# Patient Record
Sex: FEMALE | Race: BLACK OR AFRICAN AMERICAN | NOT HISPANIC OR LATINO | Employment: FULL TIME | URBAN - METROPOLITAN AREA
[De-identification: names, ages, dates, MRNs, and addresses within clinical notes are randomized per-mention and may not be internally consistent; named-entity substitution may affect disease eponyms.]

---

## 2024-07-31 ENCOUNTER — OFFICE VISIT (OUTPATIENT)
Dept: SPORTS MEDICINE | Facility: CLINIC | Age: 59
End: 2024-07-31
Payer: COMMERCIAL

## 2024-07-31 VITALS — HEIGHT: 64 IN | BODY MASS INDEX: 34.33 KG/M2 | WEIGHT: 201.06 LBS

## 2024-07-31 DIAGNOSIS — M25.562 PAIN IN BOTH KNEES, UNSPECIFIED CHRONICITY: Primary | ICD-10-CM

## 2024-07-31 DIAGNOSIS — M25.561 PAIN IN BOTH KNEES, UNSPECIFIED CHRONICITY: Primary | ICD-10-CM

## 2024-07-31 DIAGNOSIS — M17.0 BILATERAL PRIMARY OSTEOARTHRITIS OF KNEE: ICD-10-CM

## 2024-07-31 PROCEDURE — 20611 DRAIN/INJ JOINT/BURSA W/US: CPT | Mod: 50,S$GLB,, | Performed by: STUDENT IN AN ORGANIZED HEALTH CARE EDUCATION/TRAINING PROGRAM

## 2024-07-31 PROCEDURE — 99999 PR PBB SHADOW E&M-EST. PATIENT-LVL III: CPT | Mod: PBBFAC,,, | Performed by: STUDENT IN AN ORGANIZED HEALTH CARE EDUCATION/TRAINING PROGRAM

## 2024-07-31 PROCEDURE — 99499 UNLISTED E&M SERVICE: CPT | Mod: S$GLB,,, | Performed by: STUDENT IN AN ORGANIZED HEALTH CARE EDUCATION/TRAINING PROGRAM

## 2024-07-31 NOTE — PATIENT INSTRUCTIONS
Assessment:  Mackenzie Levi is a 58 y.o. female No chief complaint on file.      No diagnosis found.     Plan:  Provided viscosupplementation injection today. We discussed the proper protocols after the injection such as no submerging pools, baths tubs, or hot tubs for 24 hr.  Showering is okay today.   We discussed red flags such as fevers, chills, red, warm, tender joint at the area of injection to please seek medical care immediately.      This is a series of  three injections over three weeks. In this procedure, a gel-like fluid called hyaluronic acid is injected into the knee joint. Hyaluronic acid is a naturally occurring substance found in the synovial fluid surrounding joints. People with osteoarthritis have a lower-than-normal concentration of hyaluronic acid in their joints The theory is that adding hyaluronic acid to the arthritic joint will facilitate movement and reduce pain through a cellular level of improvement. The receptors within the knee are then down regulated to reduce the sensitivity and there is a greater stimulation production of hyaluronic acid.      Follow-up: As needed or sooner if there are any problems between now and then.    Thank you for choosing [facility] and Dr. Clifford Whitmore for your orthopedic & sports medicine care. It is our goal to provide you with exceptional care that will help keep you healthy, active, and get you back in the game.    Please do not hesitate to reach out to us via email, phone, or MyChart with any questions, concerns, or feedback.    If you felt that you received exemplary care today, please consider leaving us feedback on Healthgrades at:  [email or web address]    If you are experiencing pain/discomfort, or have questions and would like to be connected to the [facility] on-call team, please call this number and specify which Sports Medicine provider is treating  you: [phone]

## 2024-07-31 NOTE — PROCEDURES
Large Joint Aspiration/Injection: bilateral knee    Date/Time: 7/31/2024 3:40 PM    Performed by: Clifford Whitmore MD  Authorized by: Clifford Whitmore MD    Consent Done?:  Yes (Verbal)  Indications:  Arthritis  Site marked: the procedure site was marked    Timeout: prior to procedure the correct patient, procedure, and site was verified    Prep: patient was prepped and draped in usual sterile fashion      Local anesthesia used?: Yes    Local anesthetic:  Topical anesthetic    Details:  Needle Size:  22 G  Ultrasonic Guidance for needle placement?: Yes    Images are saved and documented.  Approach: Superior lateral.  Location:  Knee  Laterality:  Bilateral  Site:  Bilateral knee  Medications (Right):  20 mg sodium hyaluronate (EUFLEXXA) 10 mg/mL(mw 2.4 -3.6 million)  Medications (Left):  20 mg sodium hyaluronate (EUFLEXXA) 10 mg/mL(mw 2.4 -3.6 million)  Patient tolerance:  Patient tolerated the procedure well with no immediate complications     Ultrasound guidance was used for needle localization. Images were saved and stored for documentation. The appropriate structures were visualized. Dynamic visualization of the needle was continuous throughout the procedures and maintained good position.     We discussed the proper protocols after the injection such as no submerging pools, baths tubs, or hot tubs for 24 hr.  Showering is okay today.  We discussed red flags such as fevers, chills, red, warm, tender joint at the area of injection to please seek medical care immediately.      MEDICAL NECESSITY FOR VISCOSUPPLEMETNATION: After thorough evaluation of the patient, I have determined that visco-supplementation is medically necessary. The patient has painful degenerative changes of the knee with failure of conservative treatments including lifestyle modifications and rehabilitation exercises.  Oral analgesis/NSAIDs have not adequately controlled symptoms and there is radiographic evidence of Kellgren Ubaldo grade 2  or greater osteoarthritic changes, or in lack of radiographic evidence, there is arthroscopic or other evidence of chondrosis.     Euflexxa:  Bilateral knee 1/3

## 2024-08-07 ENCOUNTER — OFFICE VISIT (OUTPATIENT)
Dept: SPORTS MEDICINE | Facility: CLINIC | Age: 59
End: 2024-08-07
Payer: COMMERCIAL

## 2024-08-07 VITALS — HEIGHT: 64 IN | BODY MASS INDEX: 34.33 KG/M2 | WEIGHT: 201.06 LBS

## 2024-08-07 DIAGNOSIS — M17.0 BILATERAL PRIMARY OSTEOARTHRITIS OF KNEE: ICD-10-CM

## 2024-08-07 DIAGNOSIS — M25.561 PAIN IN BOTH KNEES, UNSPECIFIED CHRONICITY: Primary | ICD-10-CM

## 2024-08-07 DIAGNOSIS — M25.562 PAIN IN BOTH KNEES, UNSPECIFIED CHRONICITY: Primary | ICD-10-CM

## 2024-08-07 PROCEDURE — 99999 PR PBB SHADOW E&M-EST. PATIENT-LVL III: CPT | Mod: PBBFAC,,, | Performed by: STUDENT IN AN ORGANIZED HEALTH CARE EDUCATION/TRAINING PROGRAM

## 2024-08-14 ENCOUNTER — OFFICE VISIT (OUTPATIENT)
Dept: SPORTS MEDICINE | Facility: CLINIC | Age: 59
End: 2024-08-14
Payer: COMMERCIAL

## 2024-08-14 DIAGNOSIS — M25.561 PAIN IN BOTH KNEES, UNSPECIFIED CHRONICITY: Primary | ICD-10-CM

## 2024-08-14 DIAGNOSIS — M25.562 PAIN IN BOTH KNEES, UNSPECIFIED CHRONICITY: Primary | ICD-10-CM

## 2024-08-14 PROCEDURE — 20611 DRAIN/INJ JOINT/BURSA W/US: CPT | Mod: 50,S$GLB,, | Performed by: STUDENT IN AN ORGANIZED HEALTH CARE EDUCATION/TRAINING PROGRAM

## 2024-08-14 PROCEDURE — 99499 UNLISTED E&M SERVICE: CPT | Mod: S$GLB,,, | Performed by: STUDENT IN AN ORGANIZED HEALTH CARE EDUCATION/TRAINING PROGRAM

## 2024-08-14 PROCEDURE — 99999 PR PBB SHADOW E&M-EST. PATIENT-LVL II: CPT | Mod: PBBFAC,,, | Performed by: STUDENT IN AN ORGANIZED HEALTH CARE EDUCATION/TRAINING PROGRAM

## 2024-08-14 NOTE — PROCEDURES
Large Joint Aspiration/Injection: bilateral knee    Date/Time: 8/14/2024 2:40 PM    Performed by: Clifford Whitmore MD  Authorized by: Clifford Whitmore MD    Consent Done?:  Yes (Verbal)  Indications:  Arthritis  Site marked: the procedure site was marked    Timeout: prior to procedure the correct patient, procedure, and site was verified    Prep: patient was prepped and draped in usual sterile fashion      Local anesthesia used?: Yes    Local anesthetic:  Topical anesthetic    Details:  Needle Size:  22 G  Ultrasonic Guidance for needle placement?: Yes    Images are saved and documented.  Approach: Superior lateral.  Location:  Knee  Laterality:  Bilateral  Site:  Bilateral knee  Medications (Right):  20 mg sodium hyaluronate (EUFLEXXA) 10 mg/mL(mw 2.4 -3.6 million)  Medications (Left):  20 mg sodium hyaluronate (EUFLEXXA) 10 mg/mL(mw 2.4 -3.6 million)  Patient tolerance:  Patient tolerated the procedure well with no immediate complications     Ultrasound guidance was used for needle localization. Images were saved and stored for documentation. The appropriate structures were visualized. Dynamic visualization of the needle was continuous throughout the procedures and maintained good position.     We discussed the proper protocols after the injection such as no submerging pools, baths tubs, or hot tubs for 24 hr.  Showering is okay today.  We discussed red flags such as fevers, chills, red, warm, tender joint at the area of injection to please seek medical care immediately.      MEDICAL NECESSITY FOR VISCOSUPPLEMETNATION: After thorough evaluation of the patient, I have determined that visco-supplementation is medically necessary. The patient has painful degenerative changes of the knee with failure of conservative treatments including lifestyle modifications and rehabilitation exercises.  Oral analgesis/NSAIDs have not adequately controlled symptoms and there is radiographic evidence of Kellgren Ubaldo grade 2  or greater osteoarthritic changes, or in lack of radiographic evidence, there is arthroscopic or other evidence of chondrosis.     Euflexxa:  Bilateral knee 3/3 follow-up in 5 months

## 2024-08-15 ENCOUNTER — OFFICE VISIT (OUTPATIENT)
Dept: ORTHOPEDICS | Facility: CLINIC | Age: 59
End: 2024-08-15
Payer: COMMERCIAL

## 2024-08-15 VITALS — BODY MASS INDEX: 34.33 KG/M2 | WEIGHT: 201.06 LBS | HEIGHT: 64 IN

## 2024-08-15 DIAGNOSIS — M65.321 ACQUIRED TRIGGER FINGER OF RIGHT INDEX FINGER: Primary | ICD-10-CM

## 2024-08-15 PROCEDURE — 99999 PR PBB SHADOW E&M-EST. PATIENT-LVL III: CPT | Mod: PBBFAC,,, | Performed by: ORTHOPAEDIC SURGERY

## 2024-08-15 NOTE — PROGRESS NOTES
"    TRINH Witt M.D.  Orthopaedic Hand and Wrist Surgery  [address]    Patient ID: Mackenzie Levi  YOB: 1965  MRN: [id number]    Chief Complaint: Pain of the Right Hand      Referred By: Self,Aaareferral    History of Present Illness: Mackenzie Levi is a 58 y.o. right-hand dominant  female with a chief complaint of Pain of the Right Hand    She has a 3 to four-week history of right index finger pain and locking she denies any history of injury it is worse in the early morning she has not really tried any anti-inflammatories or splints.  Current pain level is 3/10.  She does have a history of a right index finger cyst excision from the volar soft tissue over the proximal phalanx about 30 years ago    Patient was queried and this is the extent of the patients current complaints today.    Past Medical History:     Estimated body mass index is 34.51 kg/m² as calculated from the following:    Height as of this encounter: 5' 4" (1.626 m).    Weight as of this encounter: 91.2 kg (201 lb 1 oz).  Past Medical History:   Diagnosis Date    Colon polyps     Insomnia     Internal hemorrhoids     Obesity     Tear of meniscus of right knee      Past Surgical History:   Procedure Laterality Date    ARTHROSCOPIC REMOVAL OF LOOSE BODY FROM JOINT Right 07/24/2020    Procedure: REMOVAL, LOOSE BODY, JOINT, ARTHROSCOPIC;  Surgeon: Nestor Hamilton MD;  Location: [facility] OR;  Service: Orthopedics;  Laterality: Right;    ARTHROSCOPY OF KNEE Right 07/24/2020    Procedure: ARTHROSCOPY, KNEE;  Surgeon: Nestor Hamilton MD;  Location: [facility] OR;  Service: Orthopedics;  Laterality: Right;  partial lateral meniscectomy     CHONDROPLASTY OF KNEE Right 07/24/2020    Procedure: CHONDROPLASTY, KNEE;  Surgeon: Nestor Hamilton MD;  Location: [facility] OR;  Service: Orthopedics;  Laterality: Right;    COLONOSCOPY N/A 06/01/2016    Procedure: COLONOSCOPY;  Surgeon: Anish Prasad III, MD;  " Location: [facility];  Service: Endoscopy;  Laterality: N/A;    COLONOSCOPY N/A 2019    Procedure: COLONOSCOPY;  Surgeon: Tim Padilla MD;  Location: [facility];  Service: Endoscopy;  Laterality: N/A;    FINGER SURGERY Right     HYSTERECTOMY      TAHBSO (fibroids)    KNEE ARTHROSCOPY W/ MENISCECTOMY Right 2020    Procedure: ARTHROSCOPY, KNEE, WITH MENISCECTOMY;  Surgeon: Nestor Hamilton MD;  Location: [facility] OR;  Service: Orthopedics;  Laterality: Right;    OOPHORECTOMY      THUMB ARTHROSCOPY Right     TUBAL LIGATION      WRIST SURGERY       Family History   Problem Relation Name Age of Onset    Hypertension Mother      Diabetes Mother      Kidney failure Mother           at 90    Hypertension Sister      Hypertension Father      Hypertension Sister      Breast cancer Neg Hx      Colon cancer Neg Hx      Ovarian cancer Neg Hx      Thrombophilia Neg Hx       Social History     Socioeconomic History    Marital status:    Occupational History     Employer: [employer]   Tobacco Use    Smoking status: Never     Passive exposure: Never    Smokeless tobacco: Never   Substance and Sexual Activity    Alcohol use: Yes     Alcohol/week: 1.0 standard drink of alcohol     Types: 1 Glasses of wine per week     Comment: socially    Drug use: No    Sexual activity: Not Currently     Partners: Male     Medication List with Changes/Refills   Current Medications    IPRATROPIUM (ATROVENT) 42 MCG (0.06 %) NASAL SPRAY    2 sprays by Each Nostril route 4 (four) times daily as needed for Rhinitis (congestion).    MELOXICAM (MOBIC) 15 MG TABLET    Take 0.5 tablets (7.5 mg total) by mouth once daily.    PROMETHAZINE-DEXTROMETHORPHAN (PROMETHAZINE-DM) 6.25-15 MG/5 ML SYRP    Take 5 mLs by mouth every 6 (six) hours as needed (cough/congestion).    TRAMADOL (ULTRAM) 50 MG TABLET    Take 1 tablet (50 mg total) by mouth every 6 (six) hours.     Review of patient's allergies indicates:  No Known Allergies  ROS    Physical  Exam:   GENERAL: Well appearing, appropriate for stated age, no acute distress.  CARDIOVASCULAR:  Fingers have good brisk refill and good turgor.   PULMONARY: Respirations are even and non-labored.  NEURO: Awake, alert, and oriented x 3.  PSYCH: Mood & affect are appropriate.  HEENT: Head is normocephalic and atraumatic.  Ortho/SPM Exam  Hand/Wrist Musculoskeletal Exam  Active locking of the right index finger  Tenderness over the A1 pulley of the right index finger  Surgical scar over the right index finger proximal phalanx from prior cyst removal 30 years ago  No decreased sensation in the fingertip  Full range of motion of the index finger  No extensor tendon subluxation    Imaging:    None    Other Tests:     None    Provider Note/Medical Decision Makin. Acquired trigger finger of right index finger  Assessment & Plan:  The patient and I talked at length about the natural history and pathophysiology of trigger finger, she understands that this is a chronic problem which may have acute episodic exacerbations.   Symptoms may resolve, worsen and even become permanent.  The patient understands the treatment options including observation, activity modification, therapy, NSAIDs, splints, injections and the surgical options including trigger finger release.  We discussed the risks of the diagnosis and the treatment options including pain, infection, bleeding, damage to nerves and vessels, stiffness, scarring, incomplete relief or recurrence of symptoms, poor pain and functional outcomes.  Unique risks of this diagnosis and the treatment include PIP contracture.  The patient has elected to proceed with night splints and anti-inflammatories and we will follow up in 6 weeks.             I discussed worrisome and red flag signs and symptoms with the patient. The patient expressed understanding and agreed to alert me immediately or to go to the emergency room if they experience any of these.   Treatment plan was  developed with input from the patient/family, and they expressed understanding and agreement with the plan. All questions were answered today.    There are no Patient Instructions on file for this visit.    TRINH Witt M.D.  [facility] Department of [facility] Surgery  Orthopedic Hand and Wrist Surgeon    [facility] Specialist  Dr. Raul Witt   [facility]   [employer]     Disclaimer: This note was prepared using a voice recognition system and is likely to have sound alike errors within the text.

## 2024-08-15 NOTE — ASSESSMENT & PLAN NOTE
The patient and I talked at length about the natural history and pathophysiology of trigger finger, she understands that this is a chronic problem which may have acute episodic exacerbations.   Symptoms may resolve, worsen and even become permanent.  The patient understands the treatment options including observation, activity modification, therapy, NSAIDs, splints, injections and the surgical options including trigger finger release.  We discussed the risks of the diagnosis and the treatment options including pain, infection, bleeding, damage to nerves and vessels, stiffness, scarring, incomplete relief or recurrence of symptoms, poor pain and functional outcomes.  Unique risks of this diagnosis and the treatment include PIP contracture.  The patient has elected to proceed with night splints and anti-inflammatories and we will follow up in 6 weeks.

## 2024-08-19 ENCOUNTER — HOSPITAL ENCOUNTER (OUTPATIENT)
Dept: RADIOLOGY | Facility: HOSPITAL | Age: 59
Discharge: HOME | End: 2024-08-19
Attending: FAMILY MEDICINE
Payer: COMMERCIAL

## 2024-08-19 ENCOUNTER — PATIENT MESSAGE (OUTPATIENT)
Dept: ADMINISTRATIVE | Facility: HOSPITAL | Age: 59
End: 2024-08-19
Payer: COMMERCIAL

## 2024-08-19 VITALS — WEIGHT: 200.63 LBS | BODY MASS INDEX: 34.25 KG/M2 | HEIGHT: 64 IN

## 2024-08-19 DIAGNOSIS — Z12.31 ENCOUNTER FOR SCREENING MAMMOGRAM FOR BREAST CANCER: ICD-10-CM

## 2024-08-19 PROCEDURE — 77063 BREAST TOMOSYNTHESIS BI: CPT | Mod: TC

## 2024-08-19 PROCEDURE — 77067 SCR MAMMO BI INCL CAD: CPT | Mod: 26,,, | Performed by: RADIOLOGY

## 2024-08-19 PROCEDURE — 77063 BREAST TOMOSYNTHESIS BI: CPT | Mod: 26,,, | Performed by: RADIOLOGY

## 2024-08-19 PROCEDURE — 77067 SCR MAMMO BI INCL CAD: CPT | Mod: TC

## 2024-08-21 ENCOUNTER — PATIENT OUTREACH (OUTPATIENT)
Dept: ADMINISTRATIVE | Facility: HOSPITAL | Age: 59
End: 2024-08-21
Payer: COMMERCIAL

## 2024-08-21 DIAGNOSIS — Z12.11 SCREENING FOR MALIGNANT NEOPLASM OF COLON: Primary | ICD-10-CM

## 2024-08-21 NOTE — PROGRESS NOTES
Replying to Campaign Questionnaire for Overdue HM: Colonoscopy    Referral to Endo Procedure Schedulers placed.   Portal message sent to patient.

## 2024-08-23 ENCOUNTER — OFFICE VISIT (OUTPATIENT)
Dept: URGENT CARE | Facility: CLINIC | Age: 59
End: 2024-08-23
Payer: COMMERCIAL

## 2024-08-23 VITALS
DIASTOLIC BLOOD PRESSURE: 66 MMHG | BODY MASS INDEX: 33.74 KG/M2 | HEART RATE: 71 BPM | RESPIRATION RATE: 18 BRPM | TEMPERATURE: 98 F | HEIGHT: 64 IN | WEIGHT: 197.63 LBS | SYSTOLIC BLOOD PRESSURE: 136 MMHG | OXYGEN SATURATION: 100 %

## 2024-08-23 DIAGNOSIS — M25.511 ACUTE PAIN OF RIGHT SHOULDER: Primary | ICD-10-CM

## 2024-08-23 RX ORDER — METHOCARBAMOL 500 MG/1
500 TABLET, FILM COATED ORAL 4 TIMES DAILY
Qty: 28 TABLET | Refills: 0 | Status: SHIPPED | OUTPATIENT
Start: 2024-08-23 | End: 2024-08-30

## 2024-08-23 RX ORDER — KETOROLAC TROMETHAMINE 30 MG/ML
30 INJECTION, SOLUTION INTRAMUSCULAR; INTRAVENOUS
Status: COMPLETED | OUTPATIENT
Start: 2024-08-23 | End: 2024-08-23

## 2024-08-23 RX ORDER — NAPROXEN 500 MG/1
500 TABLET ORAL 2 TIMES DAILY
Qty: 18 TABLET | Refills: 0 | Status: SHIPPED | OUTPATIENT
Start: 2024-08-23

## 2024-08-23 RX ADMIN — KETOROLAC TROMETHAMINE 30 MG: 30 INJECTION, SOLUTION INTRAMUSCULAR; INTRAVENOUS at 11:08

## 2024-08-23 NOTE — PATIENT INSTRUCTIONS

## 2024-08-23 NOTE — PROGRESS NOTES
"Subjective:      Patient ID: Mackenzie Levi is a 58 y.o. female.    Vitals:  height is 5' 4" (1.626 m) and weight is 89.7 kg (197 lb 10.3 oz). Her tympanic temperature is 98.2 °F (36.8 °C). Her blood pressure is 136/66 and her pulse is 71. Her respiration is 18 and oxygen saturation is 100%.     Chief Complaint: Pain    58 yr old female presents to the Urgent Care with complaint of bilateral shoulder pain x 5 days. Patient unsure if she slept wrong or from picking up a box. No known injury. Patient has taken Tylenol with mild relief noted.     Pain  This is a new problem. The current episode started in the past 7 days (5). The problem has been gradually worsening. Associated symptoms include myalgias. Pertinent negatives include no abdominal pain, anorexia, arthralgias, change in bowel habit, chest pain, chills, congestion, coughing, diaphoresis, fatigue, fever, headaches, joint swelling, nausea, neck pain, numbness, rash, sore throat, swollen glands, urinary symptoms, vertigo, visual change, vomiting or weakness. Nothing aggravates the symptoms. She has tried NSAIDs and acetaminophen for the symptoms.       Constitution: Negative for chills, sweating, fatigue and fever.   HENT:  Negative for congestion and sore throat.    Neck: Negative for neck pain.   Cardiovascular:  Negative for chest pain.   Respiratory:  Negative for cough.    Gastrointestinal:  Negative for abdominal pain, nausea and vomiting.   Musculoskeletal:  Positive for pain and muscle ache. Negative for joint pain and joint swelling.   Skin:  Negative for rash.   Neurological:  Negative for history of vertigo, headaches and numbness.      Objective:     Physical Exam   Constitutional: She is oriented to person, place, and time. She appears well-developed.  Non-toxic appearance. She does not appear ill. No distress.   HENT:   Head: Atraumatic.   Neck: Trachea normal. Neck supple.     No erythema present. No decreased range of motion present. pain with " movement present. No spinous process tenderness present. muscular tenderness present.   Cardiovascular: Normal rate and normal heart sounds.   Pulmonary/Chest: Effort normal and breath sounds normal.   Musculoskeletal:      Right shoulder: She exhibits tenderness. She exhibits normal range of motion, no bony tenderness and no swelling.      Left shoulder: She exhibits tenderness. She exhibits normal range of motion, no bony tenderness and no swelling.      Left ankle: No head of 5th metatarsal tenderness found.   Neurological: She is alert and oriented to person, place, and time. She has normal strength. Coordination and gait normal.   Skin: Skin is warm, dry and intact. Capillary refill takes less than 2 seconds.   Psychiatric: Her speech is normal and behavior is normal. Judgment and thought content normal.   Nursing note and vitals reviewed.      Assessment:     1. Acute pain of right shoulder      Plan:     Presentation mostly consistent with Musculoskeletal Pain.       Acute pain of right shoulder  -     ketorolac injection 30 mg  -     methocarbamoL (ROBAXIN) 500 MG Tab; Take 1 tablet (500 mg total) by mouth 4 (four) times daily. for 7 days  Dispense: 28 tablet; Refill: 0  -     naproxen (NAPROSYN) 500 MG tablet; Take 1 tablet (500 mg total) by mouth 2 (two) times daily.  Dispense: 18 tablet; Refill: 0      Patient Instructions   If you were prescribed a narcotic or controlled medication, do not drive or operate heavy equipment or machinery while taking these medications.  You must understand that you've received an Urgent Care treatment only and that you may be released before all your medical problems are known or treated. You, the patient, will arrange for follow up care as instructed.  Follow up with your PCP or specialty clinic as directed within 2-5 days if not improved or as needed.  You can call [phone] to schedule an appointment with the appropriate provider.  If your condition worsens we  recommend that you receive another evaluation at the emergency room immediately or contact your primary medical clinics after hours call service to discuss your concerns.  Please return here or go to the Emergency Department for any concerns or worsening of condition.

## 2024-08-30 ENCOUNTER — OFFICE VISIT (OUTPATIENT)
Dept: INTERNAL MEDICINE | Facility: CLINIC | Age: 59
End: 2024-08-30
Payer: COMMERCIAL

## 2024-08-30 ENCOUNTER — HOSPITAL ENCOUNTER (OUTPATIENT)
Dept: PREADMISSION TESTING | Facility: HOSPITAL | Age: 59
Discharge: HOME | End: 2024-08-30
Attending: INTERNAL MEDICINE
Payer: COMMERCIAL

## 2024-08-30 VITALS
HEIGHT: 64 IN | OXYGEN SATURATION: 100 % | HEART RATE: 74 BPM | WEIGHT: 196 LBS | BODY MASS INDEX: 33.46 KG/M2 | SYSTOLIC BLOOD PRESSURE: 134 MMHG | TEMPERATURE: 98 F | DIASTOLIC BLOOD PRESSURE: 82 MMHG

## 2024-08-30 DIAGNOSIS — Z12.11 SCREENING FOR MALIGNANT NEOPLASM OF COLON: Primary | ICD-10-CM

## 2024-08-30 DIAGNOSIS — M62.838 TRAPEZIUS MUSCLE SPASM: Primary | ICD-10-CM

## 2024-08-30 PROCEDURE — 99999 PR PBB SHADOW E&M-EST. PATIENT-LVL III: CPT | Mod: PBBFAC,,, | Performed by: NURSE PRACTITIONER

## 2024-08-30 RX ORDER — TRIAMCINOLONE ACETONIDE 40 MG/ML
60 INJECTION, SUSPENSION INTRA-ARTICULAR; INTRAMUSCULAR
Status: COMPLETED | OUTPATIENT
Start: 2024-08-30 | End: 2024-08-30

## 2024-08-30 RX ORDER — CYCLOBENZAPRINE HCL 10 MG
10 TABLET ORAL 2 TIMES DAILY
Qty: 20 TABLET | Refills: 0 | Status: SHIPPED | OUTPATIENT
Start: 2024-08-30 | End: 2024-09-09

## 2024-08-30 RX ORDER — SODIUM, POTASSIUM,MAG SULFATES 17.5-3.13G
1 SOLUTION, RECONSTITUTED, ORAL ORAL DAILY
Qty: 1 KIT | Refills: 0 | Status: SHIPPED | OUTPATIENT
Start: 2024-08-30 | End: 2024-09-01

## 2024-08-30 RX ORDER — MELOXICAM 15 MG/1
15 TABLET ORAL DAILY
Qty: 14 TABLET | Refills: 0 | Status: SHIPPED | OUTPATIENT
Start: 2024-08-30 | End: 2024-09-13

## 2024-08-30 RX ADMIN — TRIAMCINOLONE ACETONIDE 60 MG: 40 INJECTION, SUSPENSION INTRA-ARTICULAR; INTRAMUSCULAR at 01:08

## 2024-08-30 NOTE — PROGRESS NOTES
Subjective:       Patient ID: Mackenzie Levi is a 58 y.o. female.    Chief Complaint:  Right shoulder pain/neck pain  HPI    Patient says she recently was lifting some boxes and inflamed her neck and shoulder she was seen in urgent care for this problem she was given Robaxin/naproxen/Toradol shot.  She states i that this did not help her problem.  She denies headache no head trauma no falls    Past Medical History:   Diagnosis Date    Colon polyps     Insomnia     Internal hemorrhoids     Obesity     Tear of meniscus of right knee      Past Surgical History:   Procedure Laterality Date    ARTHROSCOPIC REMOVAL OF LOOSE BODY FROM JOINT Right 07/24/2020    Procedure: REMOVAL, LOOSE BODY, JOINT, ARTHROSCOPIC;  Surgeon: Nestor Hamilton MD;  Location: [facility] OR;  Service: Orthopedics;  Laterality: Right;    ARTHROSCOPY OF KNEE Right 07/24/2020    Procedure: ARTHROSCOPY, KNEE;  Surgeon: Nestor Hamilton MD;  Location: [facility] OR;  Service: Orthopedics;  Laterality: Right;  partial lateral meniscectomy     CHONDROPLASTY OF KNEE Right 07/24/2020    Procedure: CHONDROPLASTY, KNEE;  Surgeon: Nestor Hamilton MD;  Location: [facility] OR;  Service: Orthopedics;  Laterality: Right;    COLONOSCOPY N/A 06/01/2016    Procedure: COLONOSCOPY;  Surgeon: Anish Prasad III, MD;  Location: [facility];  Service: Endoscopy;  Laterality: N/A;    COLONOSCOPY N/A 11/12/2019    Procedure: COLONOSCOPY;  Surgeon: Tim Padilla MD;  [facility] ENDO;  Service: Endoscopy;  Laterality: N/A;    FINGER SURGERY Right     HYSTERECTOMY      TAHBSO (fibroids)    KNEE ARTHROSCOPY W/ MENISCECTOMY Right 07/24/2020    Procedure: ARTHROSCOPY, KNEE, WITH MENISCECTOMY;  Surgeon: Nestor Hamilton MD;  Location: [facility] OR;  Service: Orthopedics;  Laterality: Right;    OOPHORECTOMY      THUMB ARTHROSCOPY Right     TUBAL LIGATION      WRIST SURGERY       Social History     Socioeconomic History    Marital status:    Occupational History     Employer:  [employer]   Tobacco Use    Smoking status: Never     Passive exposure: Never    Smokeless tobacco: Never   Substance and Sexual Activity    Alcohol use: Yes     Alcohol/week: 1.0 standard drink of alcohol     Types: 1 Glasses of wine per week     Comment: socially    Drug use: No    Sexual activity: Not Currently     Partners: Male     Review of patient's allergies indicates:  No Known Allergies  Current Outpatient Medications   Medication Sig    cyclobenzaprine (FLEXERIL) 10 MG tablet Take 1 tablet (10 mg total) by mouth 2 (two) times a day. for 10 days    ipratropium (ATROVENT) 42 mcg (0.06 %) nasal spray 2 sprays by Each Nostril route 4 (four) times daily as needed for Rhinitis (congestion). (Patient not taking: Reported on 8/15/2024)    meloxicam (MOBIC) 15 MG tablet Take 1 tablet (15 mg total) by mouth once daily. for 14 days    promethazine-dextromethorphan (PROMETHAZINE-DM) 6.25-15 mg/5 mL Syrp Take 5 mLs by mouth every 6 (six) hours as needed (cough/congestion). (Patient not taking: Reported on 8/15/2024)    sodium,potassium,mag sulfates (SUPREP BOWEL PREP KIT) 17.5-3.13-1.6 gram SolR Take 177 mLs by mouth once daily. for 2 days (Patient not taking: Reported on 8/30/2024)     No current facility-administered medications for this visit.     Facility-Administered Medications Ordered in Other Visits   Medication    nozaseptin (NOZIN) nasal            Review of Systems   Constitutional:  Negative for activity change, appetite change, chills, diaphoresis, fatigue, fever and unexpected weight change.   HENT:  Negative for congestion, ear pain, postnasal drip, rhinorrhea, sinus pressure, sinus pain, sneezing, sore throat, tinnitus, trouble swallowing and voice change.    Eyes:  Negative for photophobia, pain and visual disturbance.   Respiratory:  Negative for cough, chest tightness, shortness of breath and wheezing.    Cardiovascular:  Negative for chest pain, palpitations and leg swelling.    Gastrointestinal:  Negative for abdominal distention, abdominal pain, constipation, diarrhea, nausea and vomiting.   Genitourinary:  Negative for decreased urine volume, difficulty urinating, dysuria, flank pain, frequency, hematuria and urgency.   Musculoskeletal:  Positive for arthralgias and myalgias. Negative for back pain, joint swelling, neck pain and neck stiffness.   Allergic/Immunologic: Negative for immunocompromised state.   Neurological:  Negative for dizziness, tremors, seizures, syncope, facial asymmetry, speech difficulty, weakness, light-headedness, numbness and headaches.   Hematological:  Negative for adenopathy. Does not bruise/bleed easily.   Psychiatric/Behavioral:  Negative for confusion and sleep disturbance.        Objective:      Physical Exam  Vitals reviewed.   Musculoskeletal:        Arms:       Comments: Swelling in spasm noted to right trap/ shoulder region   Neurological:      Mental Status: She is alert.         Assessment:     Vitals:    08/30/24 1305   BP: 134/82   Pulse: 74   Temp: 97.8 °F (36.6 °C)         1. Trapezius muscle spasm        Plan:   Trapezius muscle spasm  -     cyclobenzaprine (FLEXERIL) 10 MG tablet; Take 1 tablet (10 mg total) by mouth 2 (two) times a day. for 10 days  Dispense: 20 tablet; Refill: 0  -     meloxicam (MOBIC) 15 MG tablet; Take 1 tablet (15 mg total) by mouth once daily. for 14 days  Dispense: 14 tablet; Refill: 0  -     triamcinolone acetonide injection 60 mg      Biofreeze rub   Alternate ice and heat   Flexeril Mobic with steroid shot now med side effects discussed with patient in detail   Also recommend deep tissue massage for patient  If none of this helps to relieve issue patient recommended to see physical therapy with potential dry needling

## 2024-10-02 ENCOUNTER — LAB VISIT (OUTPATIENT)
Dept: LAB | Facility: HOSPITAL | Age: 59
End: 2024-10-02
Attending: FAMILY MEDICINE
Payer: COMMERCIAL

## 2024-10-02 DIAGNOSIS — Z00.00 ROUTINE HEALTH MAINTENANCE: ICD-10-CM

## 2024-10-02 LAB
ALBUMIN SERPL BCP-MCNC: 3.9 G/DL (ref 3.5–5.2)
ALP SERPL-CCNC: 60 U/L (ref 55–135)
ALT SERPL W/O P-5'-P-CCNC: 14 U/L (ref 10–44)
ANION GAP SERPL CALC-SCNC: 8 MMOL/L (ref 8–16)
AST SERPL-CCNC: 14 U/L (ref 10–40)
BILIRUB SERPL-MCNC: 0.6 MG/DL (ref 0.1–1)
BUN SERPL-MCNC: 18 MG/DL (ref 6–20)
CALCIUM SERPL-MCNC: 10.2 MG/DL (ref 8.7–10.5)
CHLORIDE SERPL-SCNC: 107 MMOL/L (ref 95–110)
CHOLEST SERPL-MCNC: 219 MG/DL (ref 120–199)
CHOLEST/HDLC SERPL: 2.7 {RATIO} (ref 2–5)
CO2 SERPL-SCNC: 27 MMOL/L (ref 23–29)
CREAT SERPL-MCNC: 1.1 MG/DL (ref 0.5–1.4)
EST. GFR  (NO RACE VARIABLE): 57.5 ML/MIN/1.73 M^2
ESTIMATED AVG GLUCOSE: 105 MG/DL (ref 68–131)
GLUCOSE SERPL-MCNC: 79 MG/DL (ref 70–110)
HBA1C MFR BLD: 5.3 % (ref 4–5.6)
HDLC SERPL-MCNC: 80 MG/DL (ref 40–75)
HDLC SERPL: 36.5 % (ref 20–50)
LDLC SERPL CALC-MCNC: 114.2 MG/DL (ref 63–159)
NONHDLC SERPL-MCNC: 139 MG/DL
POTASSIUM SERPL-SCNC: 4.1 MMOL/L (ref 3.5–5.1)
PROT SERPL-MCNC: 7.2 G/DL (ref 6–8.4)
SODIUM SERPL-SCNC: 142 MMOL/L (ref 136–145)
TRIGL SERPL-MCNC: 124 MG/DL (ref 30–150)

## 2024-10-02 PROCEDURE — 36415 COLL VENOUS BLD VENIPUNCTURE: CPT | Performed by: FAMILY MEDICINE

## 2024-10-02 PROCEDURE — 80061 LIPID PANEL: CPT | Performed by: FAMILY MEDICINE

## 2024-10-02 PROCEDURE — 83036 HEMOGLOBIN GLYCOSYLATED A1C: CPT | Performed by: FAMILY MEDICINE

## 2024-10-02 PROCEDURE — 80053 COMPREHEN METABOLIC PANEL: CPT | Performed by: FAMILY MEDICINE

## 2024-10-10 ENCOUNTER — OFFICE VISIT (OUTPATIENT)
Dept: INTERNAL MEDICINE | Facility: CLINIC | Age: 59
End: 2024-10-10
Payer: COMMERCIAL

## 2024-10-10 VITALS
DIASTOLIC BLOOD PRESSURE: 80 MMHG | HEIGHT: 64 IN | TEMPERATURE: 98 F | HEART RATE: 79 BPM | WEIGHT: 197.06 LBS | SYSTOLIC BLOOD PRESSURE: 130 MMHG | BODY MASS INDEX: 33.64 KG/M2 | OXYGEN SATURATION: 99 %

## 2024-10-10 DIAGNOSIS — E66.811 OBESITY, CLASS I, BMI 30-34.9: ICD-10-CM

## 2024-10-10 DIAGNOSIS — Z00.00 ROUTINE HEALTH MAINTENANCE: Primary | ICD-10-CM

## 2024-10-10 DIAGNOSIS — E78.5 HYPERLIPIDEMIA, UNSPECIFIED HYPERLIPIDEMIA TYPE: ICD-10-CM

## 2024-10-10 PROCEDURE — 1159F MED LIST DOCD IN RCRD: CPT | Mod: CPTII,S$GLB,, | Performed by: FAMILY MEDICINE

## 2024-10-10 PROCEDURE — 99396 PREV VISIT EST AGE 40-64: CPT | Mod: S$GLB,,, | Performed by: FAMILY MEDICINE

## 2024-10-10 PROCEDURE — 3079F DIAST BP 80-89 MM HG: CPT | Mod: CPTII,S$GLB,, | Performed by: FAMILY MEDICINE

## 2024-10-10 PROCEDURE — 3075F SYST BP GE 130 - 139MM HG: CPT | Mod: CPTII,S$GLB,, | Performed by: FAMILY MEDICINE

## 2024-10-10 PROCEDURE — 3008F BODY MASS INDEX DOCD: CPT | Mod: CPTII,S$GLB,, | Performed by: FAMILY MEDICINE

## 2024-10-10 PROCEDURE — 99999 PR PBB SHADOW E&M-EST. PATIENT-LVL IV: CPT | Mod: PBBFAC,,, | Performed by: FAMILY MEDICINE

## 2024-10-10 PROCEDURE — 3044F HG A1C LEVEL LT 7.0%: CPT | Mod: CPTII,S$GLB,, | Performed by: FAMILY MEDICINE

## 2024-10-10 NOTE — PROGRESS NOTES
Subjective:      Patient ID: Mackenzie Levi is a 58 y.o. female.    Chief Complaint: Annual Exam    HPI  Remarried since last spring    Couple months some nights night sweats no wt loss. Some nights w/o. Less than in past  Past Medical History:   Diagnosis Date    Colon polyps     Insomnia     Internal hemorrhoids     Obesity     Tear of meniscus of right knee       Past Surgical History:   Procedure Laterality Date    ARTHROSCOPIC REMOVAL OF LOOSE BODY FROM JOINT Right 07/24/2020    Procedure: REMOVAL, LOOSE BODY, JOINT, ARTHROSCOPIC;  Surgeon: Nestor Hamilton MD;  Location: [facility] OR;  Service: Orthopedics;  Laterality: Right;    ARTHROSCOPY OF KNEE Right 07/24/2020    Procedure: ARTHROSCOPY, KNEE;  Surgeon: Nestor Hamilton MD;  Location: [facility] OR;  Service: Orthopedics;  Laterality: Right;  partial lateral meniscectomy     CHONDROPLASTY OF KNEE Right 07/24/2020    Procedure: CHONDROPLASTY, KNEE;  Surgeon: Nestor Hamilton MD;  Location: [facility] OR;  Service: Orthopedics;  Laterality: Right;    COLONOSCOPY N/A 06/01/2016    Procedure: COLONOSCOPY;  Surgeon: Anish Prasad III, MD;  Location: [facility];  Service: Endoscopy;  Laterality: N/A;    COLONOSCOPY N/A 11/12/2019    Procedure: COLONOSCOPY;  Surgeon: Tim Padilla MD;  Location: [facility] ENDO;  Service: Endoscopy;  Laterality: N/A;    FINGER SURGERY Right     HYSTERECTOMY      TAHBSO (fibroids)    KNEE ARTHROSCOPY W/ MENISCECTOMY Right 07/24/2020    Procedure: ARTHROSCOPY, KNEE, WITH MENISCECTOMY;  Surgeon: Nestor Hamilton MD;  Location: [facility] OR;  Service: Orthopedics;  Laterality: Right;    OOPHORECTOMY      THUMB ARTHROSCOPY Right     TUBAL LIGATION      WRIST SURGERY        Social History     Socioeconomic History    Marital status:    Occupational History     Employer: [employer]   Tobacco Use    Smoking status: Never     Passive exposure: Never    Smokeless tobacco: Never   Substance and Sexual Activity    Alcohol use: Yes     Alcohol/week: 1.0  standard drink of alcohol     Types: 1 Glasses of wine per week     Comment: socially    Drug use: No    Sexual activity: Not Currently     Partners: Male   Social History Narrative           Family History   Problem Relation Name Age of Onset    Hypertension Mother      Diabetes Mother      Kidney failure Mother           at 90    Hypertension Sister      Hypertension Father      Hypertension Sister      Breast cancer Neg Hx      Colon cancer Neg Hx      Ovarian cancer Neg Hx      Thrombophilia Neg Hx        Review of Systems        Objective:     Physical Exam  Vitals and nursing note reviewed.   Constitutional:       General: She is not in acute distress.     Appearance: She is well-developed.   HENT:      Head: Atraumatic.      Right Ear: External ear normal.      Left Ear: External ear normal.      Nose: Nose normal.      Mouth/Throat:      Pharynx: No oropharyngeal exudate.   Eyes:      General: No scleral icterus.     Conjunctiva/sclera: Conjunctivae normal.      Pupils: Pupils are equal, round, and reactive to light.   Neck:      Thyroid: No thyromegaly.   Cardiovascular:      Rate and Rhythm: Normal rate and regular rhythm.      Heart sounds: Normal heart sounds. No murmur heard.  Pulmonary:      Effort: Pulmonary effort is normal. No respiratory distress.      Breath sounds: Normal breath sounds. No wheezing or rales.   Abdominal:      General: Bowel sounds are normal. There is no distension.      Palpations: Abdomen is soft. There is no mass.      Tenderness: There is no abdominal tenderness. There is no guarding or rebound.   Musculoskeletal:         General: No tenderness. Normal range of motion.      Cervical back: Normal range of motion and neck supple.   Lymphadenopathy:      Cervical: No cervical adenopathy.   Skin:     General: Skin is warm.      Coloration: Skin is not pale.      Findings: No erythema or rash.   Neurological:      Mental Status: She is alert and oriented to person,  place, and time.      Cranial Nerves: No cranial nerve deficit.      Motor: No abnormal muscle tone.      Coordination: Coordination normal.   Psychiatric:         Behavior: Behavior normal.         Thought Content: Thought content normal.         Judgment: Judgment normal.       Assessment:         ICD-10-CM ICD-9-CM   1. Routine health maintenance  Z00.00 V70.0   2. Obesity, Class I, BMI 30-34.9  E66.811 278.00   3. Hyperlipidemia, unspecified hyperlipidemia type  E78.5 272.4    Night sweats  Plan:      Exerc wt loss  Notify if still having n sweats in a few weeks and if so then eval tsh cbc, cxr etc  1. Routine health maintenance    2. Obesity, Class I, BMI 30-34.9    3. Hyperlipidemia, unspecified hyperlipidemia type       Consider new husb in bed contribu to overnight hot flashes    Routine health maintenance  -     Comprehensive Metabolic Panel; Future; Expected date: 10/10/2025  -     Lipid Panel; Future; Expected date: 10/10/2025    Obesity, Class I, BMI 30-34.9    Hyperlipidemia, unspecified hyperlipidemia type

## 2024-11-12 ENCOUNTER — HOSPITAL ENCOUNTER (OUTPATIENT)
Facility: HOSPITAL | Age: 59
Discharge: HOME | End: 2024-11-12
Attending: FAMILY MEDICINE | Admitting: FAMILY MEDICINE
Payer: COMMERCIAL

## 2024-11-12 ENCOUNTER — ANESTHESIA EVENT (OUTPATIENT)
Dept: ENDOSCOPY | Facility: HOSPITAL | Age: 59
End: 2024-11-12
Payer: COMMERCIAL

## 2024-11-12 ENCOUNTER — ANESTHESIA (OUTPATIENT)
Dept: ENDOSCOPY | Facility: HOSPITAL | Age: 59
End: 2024-11-12
Payer: COMMERCIAL

## 2024-11-12 DIAGNOSIS — Z12.11 COLON CANCER SCREENING: Primary | ICD-10-CM

## 2024-11-12 DIAGNOSIS — K64.8 INTERNAL HEMORRHOIDS: ICD-10-CM

## 2024-11-12 DIAGNOSIS — K63.5 POLYP OF COLON, UNSPECIFIED PART OF COLON, UNSPECIFIED TYPE: ICD-10-CM

## 2024-11-12 DIAGNOSIS — Z86.0100 HISTORY OF COLON POLYPS: ICD-10-CM

## 2024-11-12 PROCEDURE — 27201012 HC FORCEPS, HOT/COLD, DISP: Performed by: FAMILY MEDICINE

## 2024-11-12 PROCEDURE — 88305 TISSUE EXAM BY PATHOLOGIST: CPT | Mod: 26,,, | Performed by: STUDENT IN AN ORGANIZED HEALTH CARE EDUCATION/TRAINING PROGRAM

## 2024-11-12 PROCEDURE — 37000008 HC ANESTHESIA 1ST 15 MINUTES: Performed by: FAMILY MEDICINE

## 2024-11-12 PROCEDURE — 88305 TISSUE EXAM BY PATHOLOGIST: CPT | Mod: 59 | Performed by: STUDENT IN AN ORGANIZED HEALTH CARE EDUCATION/TRAINING PROGRAM

## 2024-11-12 PROCEDURE — 63600175 PHARM REV CODE 636 W HCPCS: Performed by: NURSE ANESTHETIST, CERTIFIED REGISTERED

## 2024-11-12 PROCEDURE — 37000009 HC ANESTHESIA EA ADD 15 MINS: Performed by: FAMILY MEDICINE

## 2024-11-12 PROCEDURE — 45380 COLONOSCOPY AND BIOPSY: CPT | Mod: 33,,, | Performed by: FAMILY MEDICINE

## 2024-11-12 PROCEDURE — 45380 COLONOSCOPY AND BIOPSY: CPT | Mod: 33 | Performed by: FAMILY MEDICINE

## 2024-11-12 RX ORDER — PROPOFOL 10 MG/ML
VIAL (ML) INTRAVENOUS
Status: DISCONTINUED | OUTPATIENT
Start: 2024-11-12 | End: 2024-11-12

## 2024-11-12 RX ORDER — LIDOCAINE HYDROCHLORIDE 10 MG/ML
INJECTION, SOLUTION EPIDURAL; INFILTRATION; INTRACAUDAL; PERINEURAL
Status: DISCONTINUED | OUTPATIENT
Start: 2024-11-12 | End: 2024-11-12

## 2024-11-12 RX ORDER — MULTIVITAMIN
1 TABLET ORAL DAILY
COMMUNITY

## 2024-11-12 RX ADMIN — PROPOFOL 30 MG: 10 INJECTION, EMULSION INTRAVENOUS at 07:11

## 2024-11-12 RX ADMIN — PROPOFOL 100 MG: 10 INJECTION, EMULSION INTRAVENOUS at 07:11

## 2024-11-12 RX ADMIN — LIDOCAINE HYDROCHLORIDE 50 MG: 10 SOLUTION INTRAVENOUS at 07:11

## 2024-11-12 NOTE — H&P
Short Stay Endoscopy History and Physical    PCP - Amador De La O MD    Procedure - Colonoscopy  ASA - 2  Mallampati - per anesthesia  History of Anesthesia problems - no  Family history Anesthesia problems -  no     HPI:  This is a 59 y.o. female here for evaluation of :   Active Hospital Problems    Diagnosis  POA    *Colon cancer screening [Z12.11]  Not Applicable    History of colon polyps [Z86.0100]  Not Applicable      Resolved Hospital Problems   No resolved problems to display.         Health Maintenance         Date Due Completion Date    Influenza Vaccine (1) 09/01/2024 10/18/2023    COVID-19 Vaccine (5 - 2024-25 season) 09/01/2024 7/7/2022    Colorectal Cancer Screening 11/12/2024 11/12/2019    Mammogram 08/19/2025 8/19/2024    Override on 1/22/2013: Done    TETANUS VACCINE 08/17/2026 8/17/2016    Hemoglobin A1c (Diabetic Prevention Screening) 10/02/2027 10/2/2024    Lipid Panel 10/02/2029 10/2/2024    Override on 6/13/2008: Done    RSV Vaccine (Age 60+ and Pregnant patients) (1 - 1-dose 75+ series) 10/31/2038 ---            Screening - Yes  History of polyps - Yes     Diarrhea - no  Anemia - no  Blood in stools - no  Abdominal pain - no  Other - no    ROS:  CONSTITUTIONAL: Denies weight change,  fatigue, fevers, chills, night sweats.  CARDIOVASCULAR: Denies chest pain, shortness of breath, orthopnea and edema.  RESPIRATORY: Denies cough, hemoptysis, dyspnea, and wheezing.  GI: See HPI.    Medical History:   Past Medical History:   Diagnosis Date    Colon polyps     Insomnia     Internal hemorrhoids     Obesity     Tear of meniscus of right knee        Surgical History:   Past Surgical History:   Procedure Laterality Date    ARTHROSCOPIC REMOVAL OF LOOSE BODY FROM JOINT Right 07/24/2020    Procedure: REMOVAL, LOOSE BODY, JOINT, ARTHROSCOPIC;  Surgeon: Nestor Hamilton MD;  Location: [facility];  Service: Orthopedics;  Laterality: Right;    ARTHROSCOPY OF KNEE Right 07/24/2020    Procedure: ARTHROSCOPY,  KNEE;  Surgeon: Nestor Hamilton MD;  Location: [facility] OR;  Service: Orthopedics;  Laterality: Right;  partial lateral meniscectomy     CHONDROPLASTY OF KNEE Right 2020    Procedure: CHONDROPLASTY, KNEE;  Surgeon: Nestor Hamilton MD;  Location: [facility] OR;  Service: Orthopedics;  Laterality: Right;    COLONOSCOPY N/A 2016    Procedure: COLONOSCOPY;  Surgeon: Anish Prasad III, MD;  Location: [facility] ENDO;  Service: Endoscopy;  Laterality: N/A;    COLONOSCOPY N/A 2019    Procedure: COLONOSCOPY;  Surgeon: Tim Padilla MD;  Location: [facility] ENDO;  Service: Endoscopy;  Laterality: N/A;    FINGER SURGERY Right     HYSTERECTOMY      TAHBSO (fibroids)    KNEE ARTHROSCOPY W/ MENISCECTOMY Right 2020    Procedure: ARTHROSCOPY, KNEE, WITH MENISCECTOMY;  Surgeon: Nestor Hamilton MD;  Location: [facility] OR;  Service: Orthopedics;  Laterality: Right;    OOPHORECTOMY      THUMB ARTHROSCOPY Right     TUBAL LIGATION      WRIST SURGERY         Family History:   Family History   Problem Relation Name Age of Onset    Hypertension Mother      Diabetes Mother      Kidney failure Mother           at 90    Hypertension Sister      Hypertension Father      Hypertension Sister      Breast cancer Neg Hx      Colon cancer Neg Hx      Ovarian cancer Neg Hx      Thrombophilia Neg Hx         Social History:   Social History     Tobacco Use    Smoking status: Never     Passive exposure: Never    Smokeless tobacco: Never   Substance Use Topics    Alcohol use: Yes     Alcohol/week: 1.0 standard drink of alcohol     Types: 1 Glasses of wine per week     Comment: socially    Drug use: No       Allergies:   Review of patient's allergies indicates:  No Known Allergies    Medications:   Current Facility-Administered Medications on File Prior to Encounter   Medication Dose Route Frequency Provider Last Rate Last Admin    nozaseptin (NOZIN) nasal    Each Nostril On Call Procedure Khushbu Dean PA-C   Given at  07/24/20 0641     Current Outpatient Medications on File Prior to Encounter   Medication Sig Dispense Refill    multivitamin (THERAGRAN) per tablet Take 1 tablet by mouth once daily.         Physical Exam:  Vital Signs:   Vitals:    11/12/24 0557   BP: 126/65   Pulse: 97   Resp: 20   Temp: (!) 49.3 °F (9.6 °C)     General Appearance: Well appearing in no acute distress  ENT: OP clear  Chest: CTA B  CV: RRR, no m/r/g  Abd: s/nt/nd/nabs  Ext: no edema    Labs:Reviewed    IMP:  Active Hospital Problems    Diagnosis  POA    *Colon cancer screening [Z12.11]  Not Applicable    History of colon polyps [Z86.0100]  Not Applicable      Resolved Hospital Problems   No resolved problems to display.         Plan:   I have explained the risks and benefits of colonoscopy to the patient including but not limited to bleeding, perforation, infection, and death. The patient wishes to proceed.

## 2024-11-12 NOTE — ANESTHESIA PREPROCEDURE EVALUATION
11/12/2024  Mackenzie Levi is a 59 y.o., female.      Pre-op Assessment    I have reviewed the Patient Summary Reports.     I have reviewed the Nursing Notes. I have reviewed the NPO Status.   I have reviewed the Medications.     Review of Systems  Anesthesia Hx:  No problems with previous Anesthesia             Denies Family Hx of Anesthesia complications.    Denies Personal Hx of Anesthesia complications.                    Social:  Social Alcohol Use, Non-Smoker       Hematology/Oncology:  Hematology Normal   Oncology Normal                                   Cardiovascular:      Denies Hypertension.   Denies MI.     Denies CABG/stent.     Denies CHF.    hyperlipidemia   ECG has been reviewed. EKG 7/2020:  Normal sinus rhythm 73  Normal ECG   When compared with ECG of 06-JUL-2017 17:01,   No significant change was found     Tread stress 1/2014:  Nuclear Quantitative Functional Analysis:   LVEF: 69 %     Impression: NORMAL MYOCARDIAL PERFUSION   1. The perfusion scan is free of evidence for myocardial ischemia or injury.   2. Resting wall motion is physiologic.   3. Resting LV function is normal.   4. The ventricular volumes are normal at rest and stress.   5. The extracardiac distribution of radioactivity is normal.   6. Clinical correlation recommended.                              Pulmonary:    Denies COPD.  Denies Asthma.     Denies Sleep Apnea.                Renal/:  Renal/ Normal                 Hepatic/GI:  Bowel Prep.    Denies GERD. Denies Liver Disease.  Denies Hepatitis.              Musculoskeletal:  Arthritis               Neurological:    Denies CVA.    Denies Seizures.                                Endocrine:  Denies Diabetes. Denies Hypothyroidism.  Denies Hyperthyroidism.       Obesity / BMI > 30  Psych:     insomnia           Physical Exam    Airway:  Mallampati: II   Mouth Opening:  Normal    Dental:  Intact    Chest/Lungs:  Clear to auscultation, Normal Respiratory Rate    Heart:  Rate: Normal  Rhythm: Regular Rhythm    Anesthesia Plan  Type of Anesthesia, risks & benefits discussed:    Anesthesia Type: MAC  Intra-op Monitoring Plan: Standard ASA Monitors  Induction:  IV  Informed Consent: Informed consent signed with the Patient and all parties understand the risks and agree with anesthesia plan.  All questions answered.   ASA Score: 2  Day of Surgery Review of History & Physical: H&P Update referred to the surgeon/provider.    Ready For Surgery From Anesthesia Perspective.   .

## 2024-11-12 NOTE — ANESTHESIA POSTPROCEDURE EVALUATION
Anesthesia Post Evaluation    Patient: Mackenzie Levi    Procedure(s) Performed: Procedure(s) (LRB):  COLONOSCOPY (N/A)    Final Anesthesia Type: MAC      Patient location during evaluation: PACU  Patient participation: Yes- Able to Participate  Level of consciousness: awake  Post-procedure vital signs: reviewed and stable  Pain management: adequate  Airway patency: patent    PONV status at discharge: No PONV  Anesthetic complications: no      Cardiovascular status: blood pressure returned to baseline and hemodynamically stable  Respiratory status: unassisted and spontaneous ventilation  Hydration status: euvolemic  Follow-up not needed.                No case tracking events are documented in the log.      Pain/Rigoberto Score: No data recorded

## 2024-11-12 NOTE — TRANSFER OF CARE
"Anesthesia Transfer of Care Note    Patient: Mackenzie Levi    Procedure(s) Performed: Procedure(s) (LRB):  COLONOSCOPY (N/A)    Patient location: PACU    Anesthesia Type: MAC    Transport from OR: Transported from OR on room air with adequate spontaneous ventilation    Post pain: adequate analgesia    Post assessment: no apparent anesthetic complications and tolerated procedure well    Post vital signs: stable    Level of consciousness: sedated    Nausea/Vomiting: no nausea/vomiting    Complications: none    Transfer of care protocol was followed    Last vitals: Visit Vitals  /65   Pulse 97   Temp (!) 9.6 °C (49.3 °F)   Resp 20   Ht 5' 4" (1.626 m)   Wt 79.4 kg (175 lb)   SpO2 96%   Breastfeeding No   BMI 30.04 kg/m²     "

## 2024-11-12 NOTE — PLAN OF CARE
Dr. Padilla  at bedside discussing findings. VSS. Patient alert. No N/V. No bleeding, no pain. Patient released from unit.

## 2024-11-12 NOTE — [employer] PATIENT INSTRUCTIONS
Discharge Summary/Instructions after an Endoscopic Procedure  Patient Name: Mackenzie Levi  Patient MRN: [id number]  Patient YOB: 1965  Tuesday, November 12, 2024 Tim Padilla MD  Dear patient,  As a result of recent federal legislation (The Federal Cures Act), you may   receive lab or pathology results from your procedure in your [facility]   account before your physician is able to contact you. Your physician or   their representative will relay the results to you with their   recommendations at their soonest availability.  Thank you,  RESTRICTIONS:  During your procedure today, you received medications for sedation.  These   medications may affect your judgment, balance and coordination.  Therefore,   for 24 hours, you have the following restrictions:   - DO NOT drive a car, operate machinery, make legal/financial decisions,   sign important papers or drink alcohol.    ACTIVITY:  Today: no heavy lifting, straining or running due to procedural   sedation/anesthesia.  The following day: return to full activity including work.  DIET:  Eat and drink normally unless instructed otherwise.     TREATMENT FOR COMMON SIDE EFFECTS:  - Mild abdominal pain, nausea, belching, bloating or excessive gas:  rest,   eat lightly and use a heating pad.  - Sore Throat: treat with throat lozenges and/or gargle with warm salt   water.  - Because air was used during the procedure, expelling large amounts of air   from your rectum or belching is normal.  - If a bowel prep was taken, you may not have a bowel movement for 1-3 days.    This is normal.  SYMPTOMS TO WATCH FOR AND REPORT TO YOUR PHYSICIAN:  1. Abdominal pain or bloating, other than gas cramps.  2. Chest pain.  3. Back pain.  4. Signs of infection such as: chills or fever occurring within 24 hours   after the procedure.  5. Rectal bleeding, which would show as bright red, maroon, or black stools.   (A tablespoon of blood from the rectum is not serious, especially if    hemorrhoids are present.)  6. Vomiting.  7. Weakness or dizziness.  GO DIRECTLY TO THE NEAREST EMERGENCY ROOM IF YOU HAVE ANY OF THE FOLLOWING:      Difficulty breathing              Chills and/or fever over 101 F   Persistent vomiting and/or vomiting blood   Severe abdominal pain   Severe chest pain   Black, tarry stools   Bleeding- more than one tablespoon   Any other symptom or condition that you feel may need urgent attention  Your doctor recommends these additional instructions:  If any biopsies were taken, your doctors clinic will contact you in 1 to 2   weeks with any results.  - Patient has a contact number available for emergencies.  The signs and   symptoms of potential delayed complications were discussed with the   patient.  Return to normal activities tomorrow.  Written discharge   instructions were provided to the patient.   - Resume previous diet.   - Continue present medications.   - Await pathology results.   - Repeat colonoscopy in 5 years for surveillance.   - Telephone my office for pathology results in 2 weeks.   - Discharge patient to home (via wheelchair).  For questions, problems or results please call your physician Tim Padilla MD at Work:  [phone]  If you have any questions about the above instructions, call the GI   department at [phone] or call the endoscopy unit at [phone]   from 7am until 3 pm.  [facility] [place], EMERGENCY ROOM PHONE NUMBER:   [phone]  IF A COMPLICATION OR EMERGENCY SITUATION ARISES AND YOU ARE UNABLE TO REACH   YOUR PHYSICIAN - GO DIRECTLY TO THE EMERGENCY ROOM.  I have read or have had read to me these discharge instructions for my   procedure and have received a written copy.  I understand these   instructions and will follow-up with my physician if I have any questions.     __________________________________       _____________________________________  Nurse Signature                                           Patient/Designated   Responsible Party Signature  Tim Padilla MD  11/12/2024 8:00:53 AM  This report has been verified and signed electronically.  Dear patient,  As a result of recent federal legislation (The Federal Cures Act), you may   receive lab or pathology results from your procedure in your [facility]   account before your physician is able to contact you. Your physician or   their representative will relay the results to you with their   recommendations at their soonest availability.  Thank you,  [employer]ATION

## 2024-11-13 VITALS
OXYGEN SATURATION: 98 % | WEIGHT: 175 LBS | SYSTOLIC BLOOD PRESSURE: 129 MMHG | DIASTOLIC BLOOD PRESSURE: 95 MMHG | BODY MASS INDEX: 29.88 KG/M2 | HEART RATE: 91 BPM | HEIGHT: 64 IN | TEMPERATURE: 98 F | RESPIRATION RATE: 17 BRPM

## 2024-11-14 LAB
FINAL PATHOLOGIC DIAGNOSIS: NORMAL
GROSS: NORMAL
Lab: NORMAL

## 2024-11-26 ENCOUNTER — IMMUNIZATION (OUTPATIENT)
Dept: INTERNAL MEDICINE | Facility: CLINIC | Age: 59
End: 2024-11-26
Payer: COMMERCIAL

## 2024-11-26 DIAGNOSIS — Z23 NEED FOR VACCINATION: Primary | ICD-10-CM

## 2024-11-26 PROCEDURE — 90656 IIV3 VACC NO PRSV 0.5 ML IM: CPT | Mod: S$GLB,,, | Performed by: FAMILY MEDICINE

## 2024-11-26 PROCEDURE — 90471 IMMUNIZATION ADMIN: CPT | Mod: S$GLB,,, | Performed by: FAMILY MEDICINE

## 2025-01-09 ENCOUNTER — OFFICE VISIT (OUTPATIENT)
Dept: PODIATRY | Facility: CLINIC | Age: 60
End: 2025-01-09
Payer: COMMERCIAL

## 2025-01-09 VITALS — HEIGHT: 64 IN | WEIGHT: 175.06 LBS | BODY MASS INDEX: 29.89 KG/M2

## 2025-01-09 DIAGNOSIS — G57.92 NEURITIS OF LEFT FOOT: Primary | ICD-10-CM

## 2025-01-09 PROCEDURE — 99999 PR PBB SHADOW E&M-EST. PATIENT-LVL III: CPT | Mod: PBBFAC,,, | Performed by: PODIATRIST

## 2025-01-09 PROCEDURE — 1160F RVW MEDS BY RX/DR IN RCRD: CPT | Mod: CPTII,S$GLB,, | Performed by: PODIATRIST

## 2025-01-09 PROCEDURE — 99214 OFFICE O/P EST MOD 30 MIN: CPT | Mod: S$GLB,,, | Performed by: PODIATRIST

## 2025-01-09 PROCEDURE — 1159F MED LIST DOCD IN RCRD: CPT | Mod: CPTII,S$GLB,, | Performed by: PODIATRIST

## 2025-01-09 PROCEDURE — 3008F BODY MASS INDEX DOCD: CPT | Mod: CPTII,S$GLB,, | Performed by: PODIATRIST

## 2025-01-09 RX ORDER — GABAPENTIN 100 MG/1
100 CAPSULE ORAL NIGHTLY
Qty: 30 CAPSULE | Refills: 0 | Status: SHIPPED | OUTPATIENT
Start: 2025-01-09

## 2025-01-09 NOTE — PROGRESS NOTES
Subjective:     Patient ID: Mackenzie Levi is a 59 y.o. female.    Chief Complaint: Foot Pain (Non-diabetic pt c/o left foot pain, pt artes 0/10 pain as of now, when hurting 10/10 pain, pt wears boots, PCP Amador De La O last seen 11/26/2024)    Mackenzie is a 59 y.o. female who presents to the podiatry clinic for follow up of left foot pain. Patient states pain 0 now but can get up to 10/10. Current symptoms include: ability to bear weight, but with some pain. Aggravating factors: inactivity and walking. Symptoms have gradually worsened. Treatment to date:  post op shoe . Patients rates pain 10/10 on pain scale. Patient has no other pedal complaints at this time.     Patient Active Problem List   Diagnosis    Arthritis of left knee    History of colon polyps    Obesity, Class I, BMI 30-34.9    Primary osteoarthritis of right knee    Internal hemorrhoids    Bucket handle tear of lateral meniscus of right knee    Acute meniscal tear of knee, right, subsequent encounter    Abnormality of gait and mobility    Decreased range of motion with decreased strength    Hip impingement syndrome, right    Pain    Decreased activities of daily living (ADL)    Decreased range of motion (ROM) of shoulder    Subacromial impingement of left shoulder    Acquired trigger finger of right index finger    Hyperlipidemia    Colon cancer screening    Colon polyps       Medication List with Changes/Refills   New Medications    GABAPENTIN (NEURONTIN) 100 MG CAPSULE    Take 1 capsule (100 mg total) by mouth nightly.   Current Medications    MULTIVITAMIN (THERAGRAN) PER TABLET    Take 1 tablet by mouth once daily.       Review of patient's allergies indicates:  No Known Allergies    Past Surgical History:   Procedure Laterality Date    ARTHROSCOPIC REMOVAL OF LOOSE BODY FROM JOINT Right 07/24/2020    Procedure: REMOVAL, LOOSE BODY, JOINT, ARTHROSCOPIC;  Surgeon: Nestor Hamilton MD;  Location: [facility];  Service: Orthopedics;  Laterality: Right;     ARTHROSCOPY OF KNEE Right 2020    Procedure: ARTHROSCOPY, KNEE;  Surgeon: Nestor Hamilton MD;  Location: [facility] OR;  Service: Orthopedics;  Laterality: Right;  partial lateral meniscectomy     CHONDROPLASTY OF KNEE Right 2020    Procedure: CHONDROPLASTY, KNEE;  Surgeon: Nestor Hamilton MD;  Location: [facility] OR;  Service: Orthopedics;  Laterality: Right;    COLONOSCOPY N/A 2016    Procedure: COLONOSCOPY;  Surgeon: Anish Prasad III, MD;  Location: [facility] ENDO;  Service: Endoscopy;  Laterality: N/A;    COLONOSCOPY N/A 2019    Procedure: COLONOSCOPY;  Surgeon: Tim Padilla MD;  Location: [facility] ENDO;  Service: Endoscopy;  Laterality: N/A;    COLONOSCOPY N/A 2024    Procedure: COLONOSCOPY;  Surgeon: Tim Padilla MD;  Location: [facility] ENDO;  Service: Endoscopy;  Laterality: N/A;    FINGER SURGERY Right     HYSTERECTOMY      TAHBSO (fibroids)    KNEE ARTHROSCOPY W/ MENISCECTOMY Right 2020    Procedure: ARTHROSCOPY, KNEE, WITH MENISCECTOMY;  Surgeon: Nestor Hamilton MD;  Location: [facility] OR;  Service: Orthopedics;  Laterality: Right;    OOPHORECTOMY      THUMB ARTHROSCOPY Right     TUBAL LIGATION      WRIST SURGERY         Family History   Problem Relation Name Age of Onset    Hypertension Mother      Diabetes Mother      Kidney failure Mother           at 90    Hypertension Sister      Hypertension Father      Hypertension Sister      Breast cancer Neg Hx      Colon cancer Neg Hx      Ovarian cancer Neg Hx      Thrombophilia Neg Hx         Social History     Socioeconomic History    Marital status:    Occupational History     Employer: [employer]   Tobacco Use    Smoking status: Never     Passive exposure: Never    Smokeless tobacco: Never   Substance and Sexual Activity    Alcohol use: Yes     Alcohol/week: 1.0 standard drink of alcohol     Types: 1 Glasses of wine per week     Comment: socially    Drug use: No    Sexual activity: Not Currently     Partners: Male  "  Social History Narrative     2024       Vitals:    01/09/25 1543   Weight: 79.4 kg (175 lb 0.7 oz)   Height: 5' 4" (1.626 m)   PainSc: 0-No pain       Review of Systems   Constitutional:  Negative for chills and fever.   Respiratory:  Negative for shortness of breath.    Cardiovascular:  Negative for chest pain, palpitations, orthopnea, claudication and leg swelling.   Gastrointestinal:  Negative for diarrhea, nausea and vomiting.   Musculoskeletal:  Negative for joint pain.   Skin:  Negative for rash.   Neurological:  Positive for tingling and sensory change.   Psychiatric/Behavioral: Negative.             Objective:      PHYSICAL EXAM: Apperance: Alert and orient in no distress,well developed, and with good attention to grooming and body habits  LOWER EXTREMITY EXAM:  VASCULAR: Dorsalis pedis pulses 2/4 bilateral and Posterior Tibial pulses 2/4 bilateral.   DERMATOLOGICAL: No skin rashes, subcutaneous nodules, lesions, or ulcers observed bilateral.  NEUROLOGICAL: Light touch, sharp-dull, proprioception all present and equal bilaterally.  Vibratory sensation intact at bilateral hallux. Protective sensation intact at all 10 sites as tested with a Monroe-Mulugeta 5.07 monofilament.   MUSCULOSKELETAL: Muscle strength is 5/5 for foot inverters, everters, plantarflexors, and dorsiflexors. Muscle tone is normal. No pain on palpation or ROM of left toes/foot.     TEST RESULTS: Radiographs of left foot/ankle taken reveals no acute findings noted.           Assessment:       ICD-10-CM ICD-9-CM   1. Neuritis of left foot  G57.92 355.8       Plan:   Neuritis of left foot  -     gabapentin (NEURONTIN) 100 MG capsule; Take 1 capsule (100 mg total) by mouth nightly.  Dispense: 30 capsule; Refill: 0  -     EMG W/ ULTRASOUND AND NERVE CONDUCTION TEST 1 Extremity; Future    I counseled the patient on her conditions, regarding findings of my examination, my impressions, and usual treatment plan.   Reviewed previous left " foot x-rays in exam room.   Discussed with patient treatments for neuropathy consisting of topical or oral medication.  Prescription written for Gabapentin 100mg to be taken once nightly. Informed patient of possible side effects including but not limited to disorientation and drowsiness. Patient instructed to discontinue use if there are any adverse effects. Patient states she understands.   Ordered left lower extremities EMG.   Recommendations given for over-the-counter medicine such as Two Old Goats and/or Capsaicin.  Counseled patient on daily foot inspections and proper shoe gear.  Patient to return after EMG completed.          Aníbal Freitas DPM  [facility]

## 2025-01-10 ENCOUNTER — PATIENT MESSAGE (OUTPATIENT)
Dept: PODIATRY | Facility: CLINIC | Age: 60
End: 2025-01-10
Payer: COMMERCIAL

## 2025-01-13 ENCOUNTER — TELEPHONE (OUTPATIENT)
Dept: PHYSICAL MEDICINE AND REHAB | Facility: CLINIC | Age: 60
End: 2025-01-13
Payer: COMMERCIAL

## 2025-01-16 ENCOUNTER — OFFICE VISIT (OUTPATIENT)
Dept: PHYSICAL MEDICINE AND REHAB | Facility: CLINIC | Age: 60
End: 2025-01-16
Payer: COMMERCIAL

## 2025-01-16 VITALS — RESPIRATION RATE: 12 BRPM | BODY MASS INDEX: 29.89 KG/M2 | HEIGHT: 64 IN | WEIGHT: 175.06 LBS

## 2025-01-16 DIAGNOSIS — G57.92 NEURITIS OF LEFT FOOT: ICD-10-CM

## 2025-01-16 PROCEDURE — 99499 UNLISTED E&M SERVICE: CPT | Mod: S$GLB,,, | Performed by: PHYSICAL MEDICINE & REHABILITATION

## 2025-01-16 PROCEDURE — 99999 PR PBB SHADOW E&M-EST. PATIENT-LVL III: CPT | Mod: PBBFAC,,, | Performed by: PHYSICAL MEDICINE & REHABILITATION

## 2025-01-16 PROCEDURE — 95885 MUSC TST DONE W/NERV TST LIM: CPT | Mod: S$GLB,,, | Performed by: PHYSICAL MEDICINE & REHABILITATION

## 2025-01-16 PROCEDURE — 95909 NRV CNDJ TST 5-6 STUDIES: CPT | Mod: S$GLB,,, | Performed by: PHYSICAL MEDICINE & REHABILITATION

## 2025-01-16 NOTE — PROGRESS NOTES
[facility]  Department of Physiatry-EMG  [facility]   [address] [place] [address]             Full Name: Mackenzie Levi YOB: 1965  Patient ID: [id number]      Visit Date: 1/16/2025 14:44  Age: 59 Years  Examining Physician: Dr Henry  Referring Physician: Dr Freitas  Conclusion: lower ext  Chief Complaint   Patient presents with    Foot Pain     left       HPI: This is a 59 y.o.  female being seen in clinic today for evaluation of chronic left foot achy pain and numbness (at the ball of her foot between 2nd and 3rd digits). Her symptoms are worse with standing and walking.  Rest provides some relief.     History obtained from patient    Past family, medical, social, and surgical history reviewed in chart    Review of Systems:     General- denies lethargy, weight change, fever, chills  Head/neck- denies swallowing difficulties  ENT- denies hearing changes  Cardiovascular-denies chest pain  Pulmonary- denies shortness of breath  GI- denies constipation or bowel incontinence  - denies bladder incontinence  Skin- denies wounds or rashes  Musculoskeletal- denies weakness, denies pain  Neurologic- denies numbness and tingling  Psychiatric- denies depressive or psychotic features, denies anxiety  Lymphatic-denies swelling  Endocrine- denies hypoglycemic symptoms/DM history  All other pertinent systems negative     Physical Examination:  General: Well developed, well nourished female, NAD  HEENT:NCAT EOMI bilaterally   Pulmonary:Normal respirations    Spinal Examination: CERVICAL  Active ROM is within normal limits.  Inspection: No deformity of spinal alignment..      Spinal Examination: LUMBAR or THORACIC  Active ROM is within normal limits.  Inspection: No deformity of spinal alignment.    Slr neg    Bilateral Upper and Lower Extremities:  Pulses are 2+ at radial bilaterally.  Shoulder/Elbow/Wrist/Hand ROM   Hip/Knee/Ankle ROM wnl  Bilateral  Extremities show normal capillary refill.  No signs of cyanosis, rubor, edema, skin changes, or dysvascular changes of appendages.  Nails appear intact.    Neurological Exam:  Cranial Nerves:  II-XII grossly intact    Manual Muscle Testing: (Motor 5=normal)  5/5 strength bilateral lower extremities    No focal atrophy is noted of either lower extremity.    Bilateral Reflexes:  No clonus at knee or ankle.    Sensation: tested to light touch  - intact in legs    Gait: Narrow base and good arm swing.      Entire procedure explained to patient prior to proceeding.  Verbal consent obtained      Sensory NCS      Nerve / Sites Rec. Site Onset Lat Peak Lat NP Amp PP Amp Segments Distance Peak Diff Velocity     ms ms µV µV  mm ms m/s   L Sural - Ankle (Calf)      Calf Ankle 1.98 2.63 9.6 15.6 Calf - Ankle 140  71   L Superficial peroneal - Ankle      Lat leg Ankle 1.71 2.52 10.2 6.4 Lat leg - Ankle 140  82   L Medial plantar, Lateral plantar - Ankle (Medial, lateral sole)      Medial plantar Sole Ankle 2.52 3.13 18.5  Medial plantar Sole - Ankle 140  56      Lateral plantar Sole Ankle 2.02 2.77 14.7 12.7 Lateral plantar Sole - Ankle 140  69         Medial plantar Sole - Lateral plantar Sole  0.35              Motor NCS      Nerve / Sites Muscle Latency Amplitude Amp % Duration Segments Distance Lat Diff Velocity     ms mV % ms  mm ms m/s   L Peroneal - EDB      Ankle EDB 4.77 4.0 100 5.98 Ankle - EDB 80        Fib head EDB 10.33 4.2 103 7.54 Fib head - Ankle 320 5.56 58      Pop fossa EDB 11.92 4.0 99.1 7.02 Pop fossa - Fib head 80 1.58 51   L Tibial - AH      Ankle AH 4.60 6.5 100 4.92 Ankle - AH 80        Pop fossa AH 12.83 4.4 68.1 4.71 Pop fossa - Ankle 390 8.23 47           EMG Summary Table     Spontaneous MUAP Recruitment   Muscle Nerve Roots IA Fib PSW Fasc H.F. Amp Dur. PPP Pattern   L. Extensor digitorum brevis Tibial L5-S1 N None None None None N N N N   L. Abductor hallucis Tibial S1-S2 N None None None None N N  N N         INTERPRETATION  -Left superficial peroneal sensory nerve conduction study showed normal peak latency and amplitude  -Left sural sensory nerve conduction study showed normal peak latency and amplitude  -Left peroneal motor nerve conduction study showed normal latency, amplitude, and conduction velocity  -Left tibial motor nerve conduction study showed normal latency, amplitude, and conduction velocity  -Left medial and lateral plantar sensory showed normal peak latency and amplitudes  -Needle EMG examination performed to above mentioned muscles       IMPRESSION  NORMAL Study  2. There is no evidence of a peroneal, tibial, sural, or superficial peroneal neuropathy of the left lower extremity. There was no evidence tarsal tunnel syndrome or a lumbar radiculopathy of the L2-S1 nerve roots    PLAN  Discussed in detail for greater than 30 minutes about diagnosis and treatment plan    1. Follow up with referring provider: Dr. Aníbal Freitas  2. Cont tx for foot/neuritis issues  3. This study is good for one year. If symptoms worsen or do not improve, please re-consult.    Alyssa Henry M.D.  Physical Medicine and Rehab

## 2025-01-29 ENCOUNTER — PATIENT MESSAGE (OUTPATIENT)
Dept: PODIATRY | Facility: CLINIC | Age: 60
End: 2025-01-29
Payer: COMMERCIAL

## 2025-01-30 ENCOUNTER — OFFICE VISIT (OUTPATIENT)
Dept: URGENT CARE | Facility: CLINIC | Age: 60
End: 2025-01-30
Payer: COMMERCIAL

## 2025-01-30 VITALS
WEIGHT: 175.25 LBS | BODY MASS INDEX: 29.92 KG/M2 | OXYGEN SATURATION: 98 % | HEART RATE: 85 BPM | RESPIRATION RATE: 18 BRPM | DIASTOLIC BLOOD PRESSURE: 68 MMHG | SYSTOLIC BLOOD PRESSURE: 143 MMHG | TEMPERATURE: 98 F | HEIGHT: 64 IN

## 2025-01-30 DIAGNOSIS — S16.1XXA CERVICAL STRAIN, ACUTE, INITIAL ENCOUNTER: Primary | ICD-10-CM

## 2025-01-30 PROCEDURE — 99213 OFFICE O/P EST LOW 20 MIN: CPT | Mod: 25,S$GLB,, | Performed by: PHYSICIAN ASSISTANT

## 2025-01-30 PROCEDURE — 96372 THER/PROPH/DIAG INJ SC/IM: CPT | Mod: JZ,S$GLB,, | Performed by: PHYSICIAN ASSISTANT

## 2025-01-30 RX ORDER — KETOROLAC TROMETHAMINE 30 MG/ML
30 INJECTION, SOLUTION INTRAMUSCULAR; INTRAVENOUS
Status: COMPLETED | OUTPATIENT
Start: 2025-01-30 | End: 2025-01-30

## 2025-01-30 RX ORDER — METHOCARBAMOL 500 MG/1
500 TABLET, FILM COATED ORAL 2 TIMES DAILY
Qty: 20 TABLET | Refills: 0 | Status: SHIPPED | OUTPATIENT
Start: 2025-01-30 | End: 2025-02-09

## 2025-01-30 RX ADMIN — KETOROLAC TROMETHAMINE 30 MG: 30 INJECTION, SOLUTION INTRAMUSCULAR; INTRAVENOUS at 09:01

## 2025-01-30 NOTE — PROGRESS NOTES
"Subjective:      Patient ID: Mackenzie Levi is a 59 y.o. female.    Vitals:  height is 5' 4" (1.626 m) and weight is 79.5 kg (175 lb 4.3 oz). Her tympanic temperature is 97.5 °F (36.4 °C). Her blood pressure is 143/68 (abnormal) and her pulse is 85. Her respiration is 18 and oxygen saturation is 98%.     Chief Complaint: Neck Pain    Pt c/o right neck/shoulder pain onset two days ago. Pt states it feels like she slept wrong on it. Pt states it feels throbbing pain but when she turns her head to the left she feels shooting pain. Pt tried heat, ibuprofen and tylenol with no relief.  No history of kidney disease.  Not on anticoagulants.    No trauma or injury.  No numbness or tingling.  No associated chest pain.     Neck Pain   This is a new problem. The current episode started in the past 7 days. The problem occurs constantly. The problem has been gradually worsening. The pain is associated with a sleep position. The pain is present in the right side. The quality of the pain is described as stabbing, aching and shooting. The pain is at a severity of 10/10. The pain is severe. The pain is Same all the time. Pertinent negatives include no chest pain, fever, headaches, leg pain, numbness, pain with swallowing, paresis, photophobia, syncope, tingling, trouble swallowing, visual change, weakness or weight loss. She has tried acetaminophen and heat for the symptoms. The treatment provided no relief.       Constitution: Negative for fever.   HENT:  Negative for trouble swallowing.    Neck: Positive for neck pain.   Cardiovascular:  Negative for chest pain and passing out.   Eyes:  Negative for photophobia.   Neurological:  Negative for headaches and numbness.      Objective:     Physical Exam   Constitutional: She is oriented to person, place, and time. She appears well-developed. She is cooperative. No distress.   HENT:   Head: Normocephalic and atraumatic.   Nose: Nose normal.   Mouth/Throat: Oropharynx is clear and moist " and mucous membranes are normal.   Eyes: Conjunctivae and lids are normal.   Neck: Trachea normal and phonation normal. Neck supple.       Cardiovascular: Normal rate, regular rhythm, normal heart sounds and normal pulses.   Pulmonary/Chest: Effort normal and breath sounds normal.   Abdominal: Normal appearance and bowel sounds are normal. She exhibits no mass. Soft.   Musculoskeletal:         General: No deformity.      Thoracic back: Normal.      Lumbar back: Normal.   Neurological: She is alert and oriented to person, place, and time. She has normal strength and normal reflexes. No sensory deficit.   Skin: Skin is warm, dry, intact, not diaphoretic and no rash.   Psychiatric: Her speech is normal and behavior is normal. Judgment and thought content normal.   Nursing note and vitals reviewed.      Assessment:     1. Cervical strain, acute, initial encounter        Plan:       Cervical strain, acute, initial encounter  -     ketorolac injection 30 mg  -     methocarbamoL (ROBAXIN) 500 MG Tab; Take 1 tablet (500 mg total) by mouth 2 (two) times daily. for 10 days  Dispense: 20 tablet; Refill: 0      Rest and no heavy lifting.  Toradol IM today in clinic.  No NSAIDs for the rest of the day.  Muscle relaxer sent to pharmacy.  Recommend not to take if planning to drive.  Topical Voltaren as needed.  Tylenol and/or Ibuprofen as needed for pain.  May need PT if no improvement with current tx plan.  Recommend following up with PCP in 1 week if no improvement.  RTC as needed or go to the ER with new or worsening symptoms.

## 2025-02-12 DIAGNOSIS — G57.92 NEURITIS OF LEFT FOOT: ICD-10-CM

## 2025-02-12 RX ORDER — GABAPENTIN 100 MG/1
100 CAPSULE ORAL NIGHTLY
Qty: 30 CAPSULE | Refills: 2 | Status: SHIPPED | OUTPATIENT
Start: 2025-02-12

## 2025-05-12 ENCOUNTER — OFFICE VISIT (OUTPATIENT)
Dept: SPORTS MEDICINE | Facility: CLINIC | Age: 60
End: 2025-05-12
Payer: COMMERCIAL

## 2025-05-12 DIAGNOSIS — M17.0 BILATERAL PRIMARY OSTEOARTHRITIS OF KNEE: Primary | ICD-10-CM

## 2025-05-12 PROCEDURE — 99214 OFFICE O/P EST MOD 30 MIN: CPT | Mod: S$GLB,,, | Performed by: STUDENT IN AN ORGANIZED HEALTH CARE EDUCATION/TRAINING PROGRAM

## 2025-05-12 PROCEDURE — 99999 PR PBB SHADOW E&M-EST. PATIENT-LVL I: CPT | Mod: PBBFAC,,, | Performed by: STUDENT IN AN ORGANIZED HEALTH CARE EDUCATION/TRAINING PROGRAM

## 2025-05-12 PROCEDURE — G2211 COMPLEX E/M VISIT ADD ON: HCPCS | Mod: S$GLB,,, | Performed by: STUDENT IN AN ORGANIZED HEALTH CARE EDUCATION/TRAINING PROGRAM

## 2025-05-12 NOTE — PROGRESS NOTES
Patient ID: Mackenzie Levi  YOB: 1965  MRN: [id number]    Chief Complaint: Pain of the Right Knee and Pain of the Left Knee    History of Present Illness:     History of Present Illness    CHIEF COMPLAINT:  - Bilateral knee pain, left knee more symptomatic    HPI:  Mackenzie presents for knee pain and is due for injections. Her left knee is more painful, with discomfort described as superior and posterior pain. She has difficulty squatting or sitting, often requiring assistance to achieve full flexion. The knee is very stiff with a sensation of having to force movement, described as pseudo-locking. Pain is worse at night, characterized as an aching sensation, which interferes with sleep. She reports pain while walking and difficulties with squatting. Since the last visit 9 months ago, she has been using ibuprofen and OTC topical treatments for pain management.    She denies any new injury to the knees, crepitus, popping, clicking, swelling, or instability. She also denies any new activities contributing to the knee pain.    PREVIOUS TREATMENTS:  -Visco injections: 9 months ago  - OTC topical and oral treatments      ROS:  ROS as indicated in HPI.         Past Medical History:   Past Medical History:   Diagnosis Date    Colon polyps     Insomnia     Internal hemorrhoids     Obesity     Tear of meniscus of right knee      Past Surgical History:   Procedure Laterality Date    ARTHROSCOPIC REMOVAL OF LOOSE BODY FROM JOINT Right 07/24/2020    Procedure: REMOVAL, LOOSE BODY, JOINT, ARTHROSCOPIC;  Surgeon: Nestor Hamilton MD;  Location: [facility];  Service: Orthopedics;  Laterality: Right;    ARTHROSCOPY OF KNEE Right 07/24/2020    Procedure: ARTHROSCOPY, KNEE;  Surgeon: Nestor Hamilton MD;  Location: [facility] OR;  Service: Orthopedics;  Laterality: Right;  partial lateral meniscectomy     CHONDROPLASTY OF KNEE Right 07/24/2020    Procedure: CHONDROPLASTY, KNEE;  Surgeon: Nestor Hamilton MD;  Location:  [facility] OR;  Service: Orthopedics;  Laterality: Right;    COLONOSCOPY N/A 2016    Procedure: COLONOSCOPY;  Surgeon: Anish Prasad III, MD;  Location: [facility] ENDO;  Service: Endoscopy;  Laterality: N/A;    COLONOSCOPY N/A 2019    Procedure: COLONOSCOPY;  Surgeon: Tim Padilla MD;  Location: [facility] ENDO;  Service: Endoscopy;  Laterality: N/A;    COLONOSCOPY N/A 2024    Procedure: COLONOSCOPY;  Surgeon: Tim Padilla MD;  Location: [facility] ENDO;  Service: Endoscopy;  Laterality: N/A;    FINGER SURGERY Right     HYSTERECTOMY      TAHBSO (fibroids)    KNEE ARTHROSCOPY W/ MENISCECTOMY Right 2020    Procedure: ARTHROSCOPY, KNEE, WITH MENISCECTOMY;  Surgeon: Nestor Hamilton MD;  Location: [facility] OR;  Service: Orthopedics;  Laterality: Right;    OOPHORECTOMY      THUMB ARTHROSCOPY Right     TUBAL LIGATION      WRIST SURGERY       Family History   Problem Relation Name Age of Onset    Hypertension Mother      Diabetes Mother      Kidney failure Mother           at 90    Hypertension Sister      Hypertension Father      Hypertension Sister      Breast cancer Neg Hx      Colon cancer Neg Hx      Ovarian cancer Neg Hx      Thrombophilia Neg Hx       Social History[1]  Medication List with Changes/Refills   Current Medications    GABAPENTIN (NEURONTIN) 100 MG CAPSULE    Take 1 capsule (100 mg total) by mouth nightly.    MULTIVITAMIN (THERAGRAN) PER TABLET    Take 1 tablet by mouth once daily.     Review of patient's allergies indicates:  No Known Allergies    Physical Exam:   There is no height or weight on file to calculate BMI.    GENERAL: Well appearing, in no acute distress.  HEAD: Normocephalic and atraumatic.  ENT: External ears and nose grossly normal.  EYES: EOMI bilaterally  PULMONARY: Respirations are grossly even and non-labored.  NEURO: Awake, alert, and oriented x 3.  SKIN: No obvious rashes appreciated.  PSYCH: Mood & affect are appropriate.    Detailed MSK exam:     Knees: Mild  effusion on left , no effusion on right. No obvious deformities, no ecchymosis, no erythema.  Full ROM. Crepitus on left. Tender to palpation at medial joint line . Good patellar mobility. Valgus @ 0 negative. Valgus @ 30 negative. Varus negative.     Imaging:     XR Results:  Results for orders placed during the hospital encounter of 05/24/24    X-ray Knee Ortho Bilateral with Flexion    Narrative  EXAM: XR KNEE ORTHO BILAT WITH FLEXION    CLINICAL HISTORY: Pain    TECHNIQUE: Frontal standing, flexion, sunrise, and lateral views of the bilateral knees    Comparison made to x-ray dated 05/15/2023.    FINDINGS:    Right:  No acute fracture or dislocation.  Bone mineralization is within normal limits.  No aggressive lytic or blastic lesion.  No osseous erosion or aggressive periosteal reaction.  Joint spaces are relatively well preserved with normal alignment.  No significant joint effusion.  Soft tissues are unremarkable.    Left:  No acute fracture or dislocation.  Bone mineralization is within normal limits.  No aggressive lytic or blastic lesion.  No osseous erosion or aggressive periosteal reaction.  Small osseous density again seen overlying the medial aspect of the intercondylar notch.  Joint spaces are otherwise relatively well preserved with normal alignment.  No significant joint effusion.  Soft tissues are unremarkable.    Impression  Unchanged compared to 05/15/2023.  Small osseous density overlying the medial aspect of the left intercondylar notch possibly representing intra-articular loose body.  No new concerning findings.    Finalized on: 5/25/2024 12:53 AM By:  Kota Monahan MD  [facility]# [id number]      2024-05-25 [id number]    [facility]      MRI Results:  Results for orders placed during the hospital encounter of 06/18/20    MRI Knee Without Contrast Right    Narrative  EXAMINATION:  MRI KNEE WITHOUT CONTRAST RIGHT    CLINICAL HISTORY:  Knee pain, persistent, > 6wks of conservative treatment;Pain in right  knee    TECHNIQUE:  Multiplanar, multisequence images were performed about the knee.    COMPARISON:  None    FINDINGS:  Menisci:    --Medial: Degenerative free edge blunting and body segment extrusion noted with degenerative posterior horn intrasubstance signal.    --Lateral: Oblique tear within the body segment suspected posterior horn complex tear including loose or flipped meniscal fragment into the intercondylar notch    Ligaments:  ACL, PCL, MCL, and LCL complex are intact.    Tendons:  Extensor mechanism is maintained.    Cartilage:    Patellofemoral: Patellar cartilage heterogenicity noted with near full-thickness fissure at the lateral aspect of the median eminence.  Trochlear cartilage intact.    Medial tibiofemoral: 7 x 6 mm focus high-grade partial to near full-thickness loss the posterior aspect of the central weight-bearing femoral condyle.    Lateral tibiofemoral: Articular cartilage is maintained.    Bone: No fracture or marrow replacing process.    Miscellaneous: No significant effusion.  Semimembranosus insertional tendinosis present.    Impression  1. Lateral meniscus tear with suspected flipped versus loose fragment in the intercondylar notch.  2. Degenerative findings medial meniscus.  3. Patellar and medial tibiofemoral chondromalacia findings as above.  4. Semimembranosus insertional tendinosis.      Electronically signed by: Markie White MD  Date:    06/19/2020  Time:    08:29        Relevant imaging results were reviewed and interpreted by me and per my read as above.  This was discussed with the patient and / or family today.     Assessment:  Mackenzie Levi is a 59 y.o. female presents today for primary osteoarthritis bilateral knees good result with Visco in the past 9 months status post her last injections and will move forward again with repeat injections today.  No red flags today follow-up Visco bilateral knees    Bilateral primary osteoarthritis of knee  -     Prior authorization  Order         MEDICAL NECESSITY FOR VISCOSUPPLEMETNATION: After thorough evaluation of the patient, I have determined that visco-supplementation is medically necessary. The patient has painful degenerative changes of the knee with failure of conservative treatments including lifestyle modifications and rehabilitation exercises.  Oral analgesis/NSAIDs have not adequately controlled symptoms and there is radiographic evidence of Kellgren Ubaldo grade 2 or greater osteoarthritic changes, or in lack of radiographic evidence, there is arthroscopic or other evidence of chondrosis.      Today's visit is associated with current or anticipated ongoing medical care related to this patient's diagnosis of osteoarthritis.  Currently there is no cure of osteoarthritis and the patient will require regular follow up to manage symptoms and progression.  The patient is to return to the office within a minimum of 3-6 months to review symptoms and function at that time.   CPT code      This note was generated with the assistance of ambient listening technology. Verbal consent was obtained by the patient and accompanying visitor(s) for the recording of patient appointment to facilitate this note. I attest to having reviewed and edited the generated note for accuracy, though some syntax or spelling errors may persist. Please contact the author of this note for any clarification.          Clifford Whitmore MD    Disclaimer: This note was prepared using a voice recognition system and is likely to have sound alike errors within the text.          [1]   Social History  Socioeconomic History    Marital status:    Occupational History     Employer: [employer]   Tobacco Use    Smoking status: Never     Passive exposure: Never    Smokeless tobacco: Never   Substance and Sexual Activity    Alcohol use: Yes     Alcohol/week: 1.0 standard drink of alcohol     Types: 1 Glasses of wine per week     Comment: socially    Drug use: No    Sexual  activity: Not Currently     Partners: Male   Social History Narrative     2024

## 2025-05-26 ENCOUNTER — OFFICE VISIT (OUTPATIENT)
Dept: SPORTS MEDICINE | Facility: CLINIC | Age: 60
End: 2025-05-26
Payer: COMMERCIAL

## 2025-05-26 DIAGNOSIS — M25.562 PAIN IN BOTH KNEES, UNSPECIFIED CHRONICITY: ICD-10-CM

## 2025-05-26 DIAGNOSIS — M25.561 PAIN IN BOTH KNEES, UNSPECIFIED CHRONICITY: ICD-10-CM

## 2025-05-26 DIAGNOSIS — M17.0 BILATERAL PRIMARY OSTEOARTHRITIS OF KNEE: Primary | ICD-10-CM

## 2025-05-26 PROCEDURE — 99499 UNLISTED E&M SERVICE: CPT | Mod: S$GLB,,, | Performed by: STUDENT IN AN ORGANIZED HEALTH CARE EDUCATION/TRAINING PROGRAM

## 2025-05-26 PROCEDURE — 20611 DRAIN/INJ JOINT/BURSA W/US: CPT | Mod: 50,S$GLB,, | Performed by: STUDENT IN AN ORGANIZED HEALTH CARE EDUCATION/TRAINING PROGRAM

## 2025-05-26 NOTE — PROCEDURES
Large Joint Aspiration/Injection: bilateral knee    Date/Time: 5/26/2025 3:20 PM    Performed by: Clifford Whitmore MD  Authorized by: Clifford Whitmore MD    Consent Done?:  Yes (Verbal)  Indications:  Arthritis  Site marked: the procedure site was marked    Timeout: prior to procedure the correct patient, procedure, and site was verified    Prep: patient was prepped and draped in usual sterile fashion      Local anesthesia used?: Yes    Local anesthetic:  Topical anesthetic    Details:  Needle Size:  22 G  Ultrasonic Guidance for needle placement?: Yes    Images are saved and documented.  Approach: Superior lateral.  Location:  Knee  Laterality:  Bilateral  Site:  Bilateral knee  Medications (Right):  20 mg sodium hyaluronate (EUFLEXXA) 10 mg/mL(mw 2.4 -3.6 million)  Medications (Left):  20 mg sodium hyaluronate (EUFLEXXA) 10 mg/mL(mw 2.4 -3.6 million)  Patient tolerance:  Patient tolerated the procedure well with no immediate complications     Ultrasound guidance was used for needle localization. Images were saved and stored for documentation. The appropriate structures were visualized. Dynamic visualization of the needle was continuous throughout the procedures and maintained good position.     We discussed the proper protocols after the injection such as no submerging pools, baths tubs, or hot tubs for 24 hr.  Showering is okay today.  We discussed red flags such as fevers, chills, red, warm, tender joint at the area of injection to please seek medical care immediately.      MEDICAL NECESSITY FOR VISCOSUPPLEMETNATION: After thorough evaluation of the patient, I have determined that visco-supplementation is medically necessary. The patient has painful degenerative changes of the knee with failure of conservative treatments including lifestyle modifications and rehabilitation exercises.  Oral analgesis/NSAIDs have not adequately controlled symptoms and there is radiographic evidence of Kellgren Ubaldo grade 2  or greater osteoarthritic changes, or in lack of radiographic evidence, there is arthroscopic or other evidence of chondrosis.     Euflexxa:  Bilateral knee 1/3

## 2025-06-02 ENCOUNTER — OFFICE VISIT (OUTPATIENT)
Dept: SPORTS MEDICINE | Facility: CLINIC | Age: 60
End: 2025-06-02
Payer: COMMERCIAL

## 2025-06-02 DIAGNOSIS — M25.562 PAIN IN BOTH KNEES, UNSPECIFIED CHRONICITY: ICD-10-CM

## 2025-06-02 DIAGNOSIS — M17.0 BILATERAL PRIMARY OSTEOARTHRITIS OF KNEE: Primary | ICD-10-CM

## 2025-06-02 DIAGNOSIS — M25.561 PAIN IN BOTH KNEES, UNSPECIFIED CHRONICITY: ICD-10-CM

## 2025-06-02 PROCEDURE — 20611 DRAIN/INJ JOINT/BURSA W/US: CPT | Mod: 50,S$GLB,, | Performed by: STUDENT IN AN ORGANIZED HEALTH CARE EDUCATION/TRAINING PROGRAM

## 2025-06-02 PROCEDURE — 99499 UNLISTED E&M SERVICE: CPT | Mod: S$GLB,,, | Performed by: STUDENT IN AN ORGANIZED HEALTH CARE EDUCATION/TRAINING PROGRAM

## 2025-06-05 ENCOUNTER — OFFICE VISIT (OUTPATIENT)
Dept: OPHTHALMOLOGY | Facility: CLINIC | Age: 60
End: 2025-06-05
Payer: COMMERCIAL

## 2025-06-05 DIAGNOSIS — H52.203 MYOPIA WITH ASTIGMATISM AND PRESBYOPIA, BILATERAL: Primary | ICD-10-CM

## 2025-06-05 DIAGNOSIS — H52.13 MYOPIA WITH ASTIGMATISM AND PRESBYOPIA, BILATERAL: Primary | ICD-10-CM

## 2025-06-05 DIAGNOSIS — H53.8 BLURRED VISION, RIGHT EYE: ICD-10-CM

## 2025-06-05 DIAGNOSIS — H52.4 MYOPIA WITH ASTIGMATISM AND PRESBYOPIA, BILATERAL: Primary | ICD-10-CM

## 2025-06-05 PROCEDURE — 92014 COMPRE OPH EXAM EST PT 1/>: CPT | Mod: S$GLB,,, | Performed by: OPTOMETRIST

## 2025-06-05 PROCEDURE — 92015 DETERMINE REFRACTIVE STATE: CPT | Mod: S$GLB,,, | Performed by: OPTOMETRIST

## 2025-06-05 PROCEDURE — 99999 PR PBB SHADOW E&M-EST. PATIENT-LVL II: CPT | Mod: PBBFAC,,, | Performed by: OPTOMETRIST

## 2025-06-09 ENCOUNTER — OFFICE VISIT (OUTPATIENT)
Dept: SPORTS MEDICINE | Facility: CLINIC | Age: 60
End: 2025-06-09
Payer: COMMERCIAL

## 2025-06-09 DIAGNOSIS — M17.0 BILATERAL PRIMARY OSTEOARTHRITIS OF KNEE: Primary | ICD-10-CM

## 2025-06-09 PROCEDURE — 99499 UNLISTED E&M SERVICE: CPT | Mod: S$GLB,,, | Performed by: STUDENT IN AN ORGANIZED HEALTH CARE EDUCATION/TRAINING PROGRAM

## 2025-06-09 PROCEDURE — 20611 DRAIN/INJ JOINT/BURSA W/US: CPT | Mod: 50,S$GLB,, | Performed by: STUDENT IN AN ORGANIZED HEALTH CARE EDUCATION/TRAINING PROGRAM

## 2025-06-10 NOTE — PROCEDURES
Large Joint Aspiration/Injection: bilateral knee    Date/Time: 6/9/2025 4:00 PM    Performed by: Clifford Whitmore MD  Authorized by: Clifford Whitmore MD    Consent Done?:  Yes (Verbal)  Indications:  Arthritis  Site marked: the procedure site was marked    Timeout: prior to procedure the correct patient, procedure, and site was verified    Prep: patient was prepped and draped in usual sterile fashion      Local anesthesia used?: Yes    Local anesthetic:  Topical anesthetic    Details:  Needle Size:  22 G  Ultrasonic Guidance for needle placement?: Yes    Images are saved and documented.  Approach: Superior lateral.  Location:  Knee  Laterality:  Bilateral  Site:  Bilateral knee  Medications (Right):  20 mg sodium hyaluronate (EUFLEXXA) 10 mg/mL(mw 2.4 -3.6 million)  Medications (Left):  20 mg sodium hyaluronate (EUFLEXXA) 10 mg/mL(mw 2.4 -3.6 million)  Patient tolerance:  Patient tolerated the procedure well with no immediate complications     Ultrasound guidance was used for needle localization. Images were saved and stored for documentation. The appropriate structures were visualized. Dynamic visualization of the needle was continuous throughout the procedures and maintained good position.     We discussed the proper protocols after the injection such as no submerging pools, baths tubs, or hot tubs for 24 hr.  Showering is okay today.  We discussed red flags such as fevers, chills, red, warm, tender joint at the area of injection to please seek medical care immediately.      MEDICAL NECESSITY FOR VISCOSUPPLEMETNATION: After thorough evaluation of the patient, I have determined that visco-supplementation is medically necessary. The patient has painful degenerative changes of the knee with failure of conservative treatments including lifestyle modifications and rehabilitation exercises.  Oral analgesis/NSAIDs have not adequately controlled symptoms and there is radiographic evidence of Kellgren Ubaldo grade 2 or  greater osteoarthritic changes, or in lack of radiographic evidence, there is arthroscopic or other evidence of chondrosis.     Euflexxa:  Bilateral knee 3/3 follow-up 5 months with Dr. Christensen

## 2025-06-12 DIAGNOSIS — M25.551 HIP PAIN, RIGHT: Primary | ICD-10-CM

## 2025-06-17 ENCOUNTER — OFFICE VISIT (OUTPATIENT)
Dept: ORTHOPEDICS | Facility: CLINIC | Age: 60
End: 2025-06-17
Payer: COMMERCIAL

## 2025-06-17 ENCOUNTER — HOSPITAL ENCOUNTER (OUTPATIENT)
Dept: RADIOLOGY | Facility: HOSPITAL | Age: 60
Discharge: HOME | End: 2025-06-17
Attending: NURSE PRACTITIONER
Payer: COMMERCIAL

## 2025-06-17 VITALS — HEIGHT: 65 IN | WEIGHT: 197.31 LBS | BODY MASS INDEX: 32.87 KG/M2

## 2025-06-17 DIAGNOSIS — M76.891 HIP FLEXOR TENDINITIS, RIGHT: Primary | ICD-10-CM

## 2025-06-17 DIAGNOSIS — M25.551 HIP PAIN, RIGHT: ICD-10-CM

## 2025-06-17 PROCEDURE — 99999 PR PBB SHADOW E&M-EST. PATIENT-LVL III: CPT | Mod: PBBFAC,,, | Performed by: NURSE PRACTITIONER

## 2025-06-17 PROCEDURE — 73502 X-RAY EXAM HIP UNI 2-3 VIEWS: CPT | Mod: 26,RT,, | Performed by: RADIOLOGY

## 2025-06-17 PROCEDURE — 1159F MED LIST DOCD IN RCRD: CPT | Mod: CPTII,S$GLB,, | Performed by: NURSE PRACTITIONER

## 2025-06-17 PROCEDURE — 97110 THERAPEUTIC EXERCISES: CPT | Mod: S$GLB,,, | Performed by: NURSE PRACTITIONER

## 2025-06-17 PROCEDURE — 3008F BODY MASS INDEX DOCD: CPT | Mod: CPTII,S$GLB,, | Performed by: NURSE PRACTITIONER

## 2025-06-17 PROCEDURE — 73502 X-RAY EXAM HIP UNI 2-3 VIEWS: CPT | Mod: TC,RT

## 2025-06-17 PROCEDURE — 99214 OFFICE O/P EST MOD 30 MIN: CPT | Mod: S$GLB,,, | Performed by: NURSE PRACTITIONER

## 2025-06-17 RX ORDER — METHOCARBAMOL 750 MG/1
750 TABLET, FILM COATED ORAL 2 TIMES DAILY PRN
Qty: 60 TABLET | Refills: 1 | Status: SHIPPED | OUTPATIENT
Start: 2025-06-17

## 2025-06-17 RX ORDER — NAPROXEN 500 MG/1
500 TABLET ORAL 2 TIMES DAILY
Qty: 60 TABLET | Refills: 0 | Status: SHIPPED | OUTPATIENT
Start: 2025-06-17

## 2025-06-17 NOTE — PROGRESS NOTES
Leonarda Thomas NP  [facility]          Chief Complaint:   Chief Complaint   Patient presents with    Right Hip - Pain        History of Present Illness: Mackenzie Levi is a 59 y.o. female   states that she has been dealing with anterior hip pain for the past year she denies any back pain she denies any numbness and tingling.  She does do a lot of sitting at work.  She has noticed snapping in the anterior hip where the pain is located    ROS:   Neuro: Awake, alert and oriented  Pulm: no resp distress  Muscloskeletal:  Right anterior hip pain and popping and catching    Past Medical History:   Past Medical History:   Diagnosis Date    Colon polyps     Insomnia     Internal hemorrhoids     Obesity     Tear of meniscus of right knee       Past Surgical History:   Procedure Laterality Date    ARTHROSCOPIC REMOVAL OF LOOSE BODY FROM JOINT Right 07/24/2020    Procedure: REMOVAL, LOOSE BODY, JOINT, ARTHROSCOPIC;  Surgeon: Nestor Hamilton MD;  Location: [facility];  Service: Orthopedics;  Laterality: Right;    ARTHROSCOPY OF KNEE Right 07/24/2020    Procedure: ARTHROSCOPY, KNEE;  Surgeon: Nestor Hamilton MD;  Location: [facility] OR;  Service: Orthopedics;  Laterality: Right;  partial lateral meniscectomy     CHONDROPLASTY OF KNEE Right 07/24/2020    Procedure: CHONDROPLASTY, KNEE;  Surgeon: Nestor Hamilton MD;  Location: [facility] OR;  Service: Orthopedics;  Laterality: Right;    COLONOSCOPY N/A 06/01/2016    Procedure: COLONOSCOPY;  Surgeon: Anish Prasad III, MD;  Location: [facility];  Service: Endoscopy;  Laterality: N/A;    COLONOSCOPY N/A 11/12/2019    Procedure: COLONOSCOPY;  Surgeon: Tim Padilla MD;  Location: [facility] ENDO;  Service: Endoscopy;  Laterality: N/A;    COLONOSCOPY N/A 11/12/2024    Procedure: COLONOSCOPY;  Surgeon: Tim Padilla MD;  Location: [facility];  Service: Endoscopy;  Laterality: N/A;    FINGER SURGERY Right     HYSTERECTOMY      TAHBSO (fibroids)  "   KNEE ARTHROSCOPY W/ MENISCECTOMY Right 2020    Procedure: ARTHROSCOPY, KNEE, WITH MENISCECTOMY;  Surgeon: Nestor Hamilton MD;  Location: [facility] ER;  Service: Orthopedics;  Laterality: Right;    OOPHORECTOMY      THUMB ARTHROSCOPY Right     TUBAL LIGATION      WRIST SURGERY        Family History   Problem Relation Name Age of Onset    Hypertension Mother      Diabetes Mother      Kidney failure Mother           at 90    Hypertension Sister      Hypertension Father      Hypertension Sister      Breast cancer Neg Hx      Colon cancer Neg Hx      Ovarian cancer Neg Hx      Thrombophilia Neg Hx        Social History[1]   Medication List with Changes/Refills   New Medications    METHOCARBAMOL (ROBAXIN) 750 MG TAB    Take 1 tablet (750 mg total) by mouth 2 (two) times daily as needed.    NAPROXEN (NAPROSYN) 500 MG TABLET    Take 1 tablet (500 mg total) by mouth 2 (two) times daily.   Current Medications    GABAPENTIN (NEURONTIN) 100 MG CAPSULE    Take 1 capsule (100 mg total) by mouth nightly.    MULTIVITAMIN (THERAGRAN) PER TABLET    Take 1 tablet by mouth once daily.      Review of patient's allergies indicates:  No Known Allergies       Physical Exam:   BMI: Body mass index is 32.83 kg/m². 59 y.o. female  5' 5" (1.651 m) 89.5 kg (197 lb 5 oz)   Right HIP Exam:  Internal Rotation: 40  External Rotation- 50°  SLR: resisted  Abduction: 40-45°   Add :  20°  Flexion: 100-120  YOVANNY test: (+) recreates pain in groin, occasional pain and popping to the anterior superior iliac spine perceived by the patient when performing YOVANNY maneuver.   Full lumbar spine range of motion distal lower extremity strength sensation circulation are intact    Imaging: Relevant imaging results reviewed and interpreted and discussed with the patient and/or family today.   X-rays today of the right hip demonstrates mild superior acetabular rim spurring otherwise well-preserved acetabular joint space no acute " findings    Assessment/Plan:  1. Hip flexor tendinitis, right    Other orders  -     naproxen (NAPROSYN) 500 MG tablet; Take 1 tablet (500 mg total) by mouth 2 (two) times daily.  Dispense: 60 tablet; Refill: 0  -     methocarbamoL (ROBAXIN) 750 MG Tab; Take 1 tablet (750 mg total) by mouth 2 (two) times daily as needed.  Dispense: 60 tablet; Refill: 1       Patient Instructions   PT/OT:   I will send the patient to formal physical therapy at [facility] for anterior hip flexor tendinitis and formal right hip evaluation    Medications:    Naproxen 500 mg b.i.d.  Robaxin 750 mg b.i.d.     Education:    I personally spent 8 minutes developing, teaching, performing  and explaining a home exercise/stretching regimen for the treatment of  anterior hip pain stretching flexibility getting up and moving at work until she gets into formal physical therapy. Patient demonstrated understanding.all questions were answered and counseling provided to encourage patient to do these daily. CPT 09446-HF       Return to clinic:    4 weeks to evaluate her progress to consider if an MRI of right hip is necessary      I discussed worrisome and red flag signs and symptoms with the patient. The patient expressed understanding and agreed to alert me immediately or to go to the emergency room if they experience any of these.   Treatment plan was developed with input from the patient/family, and they expressed understanding and agreement with the plan. All questions were answered today.             Leonarda Thomas NP-C  Orthopedic Nurse Pracitioner  [place]         [1]   Social History  Socioeconomic History    Marital status:    Occupational History     Employer: [employer]   Tobacco Use    Smoking status: Never     Passive exposure: Never    Smokeless tobacco: Never   Substance and Sexual Activity    Alcohol use: Yes     Alcohol/week: 1.0 standard drink of alcohol     Types: 1 Glasses of wine per week     Comment:  socially    Drug use: No    Sexual activity: Not Currently     Partners: Male   Social History Narrative     2024

## 2025-07-07 ENCOUNTER — OFFICE VISIT (OUTPATIENT)
Dept: INTERNAL MEDICINE | Facility: CLINIC | Age: 60
End: 2025-07-07
Payer: COMMERCIAL

## 2025-07-07 ENCOUNTER — HOSPITAL ENCOUNTER (OUTPATIENT)
Dept: RADIOLOGY | Facility: HOSPITAL | Age: 60
Discharge: HOME | End: 2025-07-07
Attending: FAMILY MEDICINE
Payer: COMMERCIAL

## 2025-07-07 VITALS
HEART RATE: 68 BPM | WEIGHT: 201.06 LBS | TEMPERATURE: 97 F | DIASTOLIC BLOOD PRESSURE: 88 MMHG | BODY MASS INDEX: 33.5 KG/M2 | SYSTOLIC BLOOD PRESSURE: 130 MMHG | HEIGHT: 65 IN | OXYGEN SATURATION: 98 %

## 2025-07-07 DIAGNOSIS — R05.9 COUGH, UNSPECIFIED TYPE: Primary | ICD-10-CM

## 2025-07-07 DIAGNOSIS — R05.9 COUGH, UNSPECIFIED TYPE: ICD-10-CM

## 2025-07-07 DIAGNOSIS — N18.31 CHRONIC KIDNEY DISEASE, STAGE 3A: ICD-10-CM

## 2025-07-07 PROCEDURE — 3079F DIAST BP 80-89 MM HG: CPT | Mod: CPTII,S$GLB,, | Performed by: FAMILY MEDICINE

## 2025-07-07 PROCEDURE — G2211 COMPLEX E/M VISIT ADD ON: HCPCS | Mod: S$GLB,,, | Performed by: FAMILY MEDICINE

## 2025-07-07 PROCEDURE — 3075F SYST BP GE 130 - 139MM HG: CPT | Mod: CPTII,S$GLB,, | Performed by: FAMILY MEDICINE

## 2025-07-07 PROCEDURE — 71046 X-RAY EXAM CHEST 2 VIEWS: CPT | Mod: TC

## 2025-07-07 PROCEDURE — 99214 OFFICE O/P EST MOD 30 MIN: CPT | Mod: S$GLB,,, | Performed by: FAMILY MEDICINE

## 2025-07-07 PROCEDURE — 99999 PR PBB SHADOW E&M-EST. PATIENT-LVL IV: CPT | Mod: PBBFAC,,, | Performed by: FAMILY MEDICINE

## 2025-07-07 PROCEDURE — 71046 X-RAY EXAM CHEST 2 VIEWS: CPT | Mod: 26,,, | Performed by: STUDENT IN AN ORGANIZED HEALTH CARE EDUCATION/TRAINING PROGRAM

## 2025-07-07 PROCEDURE — 1159F MED LIST DOCD IN RCRD: CPT | Mod: CPTII,S$GLB,, | Performed by: FAMILY MEDICINE

## 2025-07-07 PROCEDURE — 3008F BODY MASS INDEX DOCD: CPT | Mod: CPTII,S$GLB,, | Performed by: FAMILY MEDICINE

## 2025-07-07 RX ORDER — ALBUTEROL SULFATE 90 UG/1
2 INHALANT RESPIRATORY (INHALATION) EVERY 6 HOURS PRN
Qty: 18 G | Refills: 0 | Status: SHIPPED | OUTPATIENT
Start: 2025-07-07 | End: 2026-07-07

## 2025-07-07 RX ORDER — BENZONATATE 100 MG/1
100 CAPSULE ORAL 3 TIMES DAILY PRN
Qty: 30 CAPSULE | Refills: 0 | Status: SHIPPED | OUTPATIENT
Start: 2025-07-07 | End: 2025-07-17

## 2025-07-07 NOTE — PROGRESS NOTES
Chief Complaint: Cough (For about 3 weeks as well as discomfort in chest.)    History of Present Illness    CHIEF COMPLAINT:  Ms. Levi presents today for persistent cough of three weeks duration.    HISTORY OF PRESENT ILLNESS:  She reports cough symptoms began three weeks ago with initial sinus congestion, itchy throat, and runny nose. While initial symptoms cleared, the cough has persisted for approximately one month with a pattern of intermittent improvement followed by recurrence. The cough is notably worse at night, frequently disrupting sleep, and produces clear mucus. She experiences mild chest tightness after coughing episodes, transient shortness of breath, and self-noted wheezing. She denies fever.    MEDICAL HISTORY:  She denies history of asthma. She reports previous COVID infection in June of last year.    MEDICATIONS:  She has previously tried Shira-Hargill cold. She has unused Phenergan DM from previous year which was not utilized. She has discussed using Mucinex DM and Tessalon Perles as alternative cough medications.          Objective:   Physical Exam    Vitals: Reviewed. Nursing note reviewed. BLOOD PRESSURE: 130/88.  Constitutional: Alert.  HENT: Normocephalic. Atraumatic. External ears normal. Nose normal. PERRL. Conjunctivae normal.  Neck: ROM normal. Supple.  Cardiovascular: Normal rate and regular rhythm. Normal heart sounds.  Pulmonary: Normal breath sounds. Effort normal.  Abdominal: Bowel sounds are normal. Soft.  Musculoskeletal: ROM normal.  Skin: Warm. Dry.  Neurological: Oriented x3.  Psychiatric: Behavior normal. Thought content normal. Judgment normal.       Assessment:       1. Cough, unspecified type    2. Chronic kidney disease, stage 3a        Plan:   Assessment & Plan    Assessed lingering cough of 3 weeks duration as likely post-bronchitic cough.  Considered pneumonia unlikely due to absence of fever, but ordered chest XR for confirmation.    PLAN SUMMARY:  Prescribed albuterol  for wheeze and airway relief, especially at night  Ordered chest XR to rule out pneumonia  Recommend Mucinex DM for daytime cough relief  Suggested Tessalon Perles for cough suppression (day or night)  Advised using existing Phenergan DM or Tussinex for nighttime cough relief  Follow-up after chest XR results for further evaluation    ACUTE COUGH:  Monitored the patient's cough which has persisted on and off for about 3 weeks, worse at night, with clear mucus production.  Evaluated the condition as likely a post-bronchitic cough, lingering after initial congestion and cold symptoms.  Explained to the patient that post-bronchitic cough can persist up to 6 weeks after initial infection resolves.  Ordered chest XR to rule out pneumonia.  Recommend multiple medication options for cough management: Mucinex DM for daytime relief, Tessalon Perles for cough suppression (usable day or night), and advised using existing Phenergan DM (promethazine DM) or Tussinex for nighttime cough relief.    WHEEZING:  Monitored the patient who reported hearing a little wheeze while sitting on the couch.  Wheezing was noted during initial exam but not heard later.  Prescribed albuterol to help with wheeze and open airways, particularly for nighttime relief.    CHEST PAIN:  Monitored the patient who experienced chest tightness after coughing, possibly due to frequent coughing.  Ordered chest XR for confirmation.    SHORTNESS OF BREATH:  Monitored the patient who felt a little winded yesterday but it passed off.  Prescribed albuterol to help with wheeze and open airways.    HISTORY OF COVID-19:  Evaluated the patient who had COVID-19 in June of last year, diagnosed at urgent care.  Explained that post-bronchitic cough can persist up to 6 weeks after initial infection resolves.          Cough, unspecified type  -     X-Ray Chest PA And Lateral; Future; Expected date: 07/07/2025    Chronic kidney disease, stage 3a    Other orders  -      albuterol (VENTOLIN HFA) 90 mcg/actuation inhaler; Inhale 2 puffs into the lungs every 6 (six) hours as needed for Wheezing. Rescue  Dispense: 18 g; Refill: 0  -     benzonatate (TESSALON) 100 MG capsule; Take 1 capsule (100 mg total) by mouth 3 (three) times daily as needed for Cough.  Dispense: 30 capsule; Refill: 0     If no better 2-3 weeks followup sooner if any worsening or change in symptoms or lack of resolution    Has leftover phen dm at home.caution sedationand avoid concurrent use with mucinex dm

## 2025-07-07 NOTE — PATIENT INSTRUCTIONS
Please obtain the RSV vaccine via your pharmacy  Prevnar 20 vaccine  via a pharmacy (new pneumonia vaccine)   Either mucinex dm in the evening or prometh dm at bedtime  Daytime mucinex dm with or without the tessalon perles

## 2025-08-15 DIAGNOSIS — G57.92 NEURITIS OF LEFT FOOT: ICD-10-CM

## 2025-08-15 RX ORDER — GABAPENTIN 100 MG/1
100 CAPSULE ORAL NIGHTLY
Qty: 30 CAPSULE | Refills: 2 | Status: CANCELLED | OUTPATIENT
Start: 2025-08-15

## 2025-08-27 ENCOUNTER — PATIENT MESSAGE (OUTPATIENT)
Dept: PODIATRY | Facility: CLINIC | Age: 60
End: 2025-08-27
Payer: COMMERCIAL

## 2025-08-28 DIAGNOSIS — G57.92 NEURITIS OF LEFT FOOT: ICD-10-CM

## 2025-08-28 RX ORDER — GABAPENTIN 100 MG/1
100 CAPSULE ORAL NIGHTLY
Qty: 30 CAPSULE | Refills: 2 | Status: SHIPPED | OUTPATIENT
Start: 2025-08-28